# Patient Record
Sex: FEMALE | Race: WHITE | NOT HISPANIC OR LATINO | Employment: FULL TIME | URBAN - METROPOLITAN AREA
[De-identification: names, ages, dates, MRNs, and addresses within clinical notes are randomized per-mention and may not be internally consistent; named-entity substitution may affect disease eponyms.]

---

## 2022-01-08 ENCOUNTER — OFFICE VISIT (OUTPATIENT)
Dept: URGENT CARE | Facility: CLINIC | Age: 37
End: 2022-01-08
Payer: COMMERCIAL

## 2022-01-08 ENCOUNTER — APPOINTMENT (OUTPATIENT)
Dept: RADIOLOGY | Facility: CLINIC | Age: 37
End: 2022-01-08
Payer: COMMERCIAL

## 2022-01-08 VITALS
HEIGHT: 63 IN | BODY MASS INDEX: 33.31 KG/M2 | HEART RATE: 78 BPM | WEIGHT: 188 LBS | TEMPERATURE: 98.3 F | OXYGEN SATURATION: 99 % | RESPIRATION RATE: 20 BRPM

## 2022-01-08 DIAGNOSIS — J12.82 PNEUMONIA DUE TO COVID-19 VIRUS: ICD-10-CM

## 2022-01-08 DIAGNOSIS — R07.89 CHEST TIGHTNESS: ICD-10-CM

## 2022-01-08 DIAGNOSIS — R07.89 CHEST TIGHTNESS: Primary | ICD-10-CM

## 2022-01-08 DIAGNOSIS — U07.1 PNEUMONIA DUE TO COVID-19 VIRUS: ICD-10-CM

## 2022-01-08 PROCEDURE — 71046 X-RAY EXAM CHEST 2 VIEWS: CPT

## 2022-01-08 PROCEDURE — 99213 OFFICE O/P EST LOW 20 MIN: CPT | Performed by: PHYSICIAN ASSISTANT

## 2022-01-08 RX ORDER — LORAZEPAM 1 MG/1
1 TABLET ORAL
COMMUNITY

## 2022-01-08 RX ORDER — MOMETASONE FUROATE 220 UG/1
2 INHALANT RESPIRATORY (INHALATION) 2 TIMES DAILY
Qty: 1 EACH | Refills: 0 | Status: SHIPPED | OUTPATIENT
Start: 2022-01-08 | End: 2022-01-08 | Stop reason: CLARIF

## 2022-01-08 RX ORDER — ALBUTEROL SULFATE 2.5 MG/3ML
2.5 SOLUTION RESPIRATORY (INHALATION) EVERY 4 HOURS PRN
Qty: 240 ML | Refills: 0 | Status: SHIPPED | OUTPATIENT
Start: 2022-01-08

## 2022-01-08 RX ORDER — TOPIRAMATE 100 MG/1
100 TABLET, FILM COATED ORAL
COMMUNITY
Start: 2021-11-30

## 2022-01-08 RX ORDER — CYCLOBENZAPRINE HCL 10 MG
10 TABLET ORAL
COMMUNITY

## 2022-01-08 RX ORDER — GUAIFENESIN AND CODEINE PHOSPHATE 100; 10 MG/5ML; MG/5ML
SOLUTION ORAL
Qty: 120 ML | Refills: 0 | Status: SHIPPED | OUTPATIENT
Start: 2022-01-08

## 2022-01-08 RX ORDER — ALBUTEROL SULFATE 90 UG/1
2 AEROSOL, METERED RESPIRATORY (INHALATION)
COMMUNITY

## 2022-01-08 RX ORDER — FLUTICASONE PROPIONATE 220 UG/1
AEROSOL, METERED RESPIRATORY (INHALATION)
Qty: 12 G | Refills: 0 | Status: SHIPPED | OUTPATIENT
Start: 2022-01-08

## 2022-01-08 NOTE — LETTER
January 8, 2022     Patient: Alonso Mendez   YOB: 1985   Date of Visit: 1/8/2022       To Whom It May Concern:      Patient was seen in office today for ongoing medical ailment  Recommend continue off work until released by her primary care provider         Sincerely,        Gurvinder Elliott PA-C    CC: No Recipients

## 2022-01-08 NOTE — PROGRESS NOTES
[facility]    NAME: Isela Officer is a 43 y o  female  : 1985    MRN: [id number]  DATE: 2022  TIME: 12:55 PM    Assessment and Plan   Chest tightness [R07 89]  1  Chest tightness  XR chest pa & lateral   2  Pneumonia due to COVID-19 virus  albuterol (2 5 mg/3 mL) 0 083 % nebulizer solution    guaifenesin-codeine (GUAIFENESIN AC) 100-10 MG/5ML liquid    fluticasone (Flovent HFA) 220 mcg/act inhaler    DISCONTINUED: mometasone (Asmanex, 60 Metered Doses,) 220 MCG/INH inhaler     Chest x-ray:  Ground-glass appearance consistent with viral pneumonia in setting of positive COVID patient  Patient Instructions   Patient Instructions   If you do not already have a follow up appointment scheduled with your PCP for the next 1-2 days, please call them ASAP to arrange follow up examination  Use your albuterol inhaler as needed  and / or nebulizer  Start steroid inhaler (see instructions below and on prescription)  Add expectorant such as plain Mucinex  Take with full glass of water  Prescription cough medicine for bedtime only  We do not want to necessarily stop your cough as that is the body's way to protect your airways  Try to stay well hydrated  Try to not just sit around but move around your quarantine area  Take a couple deep breaths every hour while awake to help expand her lungs  Recommendations for COVID patients:    Vitamins:  Vitamin D3 2000 International Units daily  Pulse Oximeter Monitoring: For LOW risk patients:      Monitor your pulse / oxygen level at rest and while walking  If your oxygen level is below 90% at rest, go to ER for further evaluation  If your oxygen level is below 90% with activity (walking), go to ER for further evaluation       For HIGH risk patients:  (age > 72, BMI > 35, immunocompromised, chronic kidney disease, chronic lung disease, chronic heart disease, cancer of lung - blood or metastatic cancer )      Monitor your pulse / oxygen level at rest and while walking  If your oxygen level is below 90% at rest, go to ER for further evaluation  If your oxygen level is below 90% with activity (walking), go to ER for further evaluation  If significant worsening of your symptoms with profound weakness, shortness of breath, chest pain proceed to ER for immediate further evaluation  Avoid in store purchasing of supplies, foods, medications  Steroid inhaler:  Some people will benefit from using steroid inhaler  If prescribed please follow prescription instructions  Use as instructed  Always rinse mouth / throat out thoroughly after using  Chief Complaint     Chief Complaint   Patient presents with    COVID-19     Covid + 12/31/2022, Patient resports Chest Tightness        History of Present Illness   Tomi Ritter presents to the clinic c/o  78-year-old female comes in with tightness in chest, diarrhea, diaphoresis, nausea and congestion  Positive COVID test on 12/31/2021  Chest pain with cough and with breathing  Chest tightness  Tired  Smoker  No COVID vaccine  Has tried to get in with her family doctor but they will not see her  She tried to get albuterol inhaled solution for her nebulizer but they would not do that and told her she had to go to the walk-in clinic  She is taking her pulse ox and with exertion her oxygen level goes down to 93  Review of Systems   Review of Systems   Constitutional: Positive for activity change, appetite change, diaphoresis and fatigue  HENT: Positive for congestion  Respiratory: Positive for chest tightness and shortness of breath  Cardiovascular: Negative          Current Medications     Long-Term Medications   Medication Sig Dispense Refill    cyclobenzaprine (FLEXERIL) 10 mg tablet Take 10 mg by mouth      LORazepam (ATIVAN) 1 mg tablet Take 1 mg by mouth      topiramate (TOPAMAX) 100 mg tablet Take 100 mg by mouth      fluticasone (Flovent HFA) 220 mcg/act inhaler 2 inhalations twice a day  Rinse mouth after use  12 g 0       Current Allergies     Allergies as of 2022 - Reviewed 2022   Allergen Reaction Noted    Penicillins Hives 2013          The following portions of the patient's history were reviewed and updated as appropriate: allergies, current medications, past family history, past medical history, past social history, past surgical history and problem list   Past Medical History:   Diagnosis Date    Anxiety     Asthma     COPD (chronic obstructive pulmonary disease) ([facility] [address] )     Migraines      Past Surgical History:   Procedure Laterality Date     SECTION      WISDOM TOOTH EXTRACTION       History reviewed  No pertinent family history  Objective   Pulse 78   Temp 98 3 °F (36 8 °C)   Resp 20   Ht 5' 3" (1 6 m)   Wt 85 3 kg (188 lb)   LMP 12/15/2021   SpO2 99%   BMI 33 30 kg/m²   Patient's last menstrual period was 12/15/2021  Physical Exam     Physical Exam  Vitals and nursing note reviewed  Constitutional:       General: She is not in acute distress  Appearance: Normal appearance  She is ill-appearing  She is not toxic-appearing or diaphoretic  Comments: Appears mildly ill but in no acute distress  HENT:      Head: Normocephalic and atraumatic  Nose: Congestion and rhinorrhea present  Mouth/Throat:      Mouth: Mucous membranes are moist       Pharynx: No oropharyngeal exudate or posterior oropharyngeal erythema  Comments: Cobblestoning posterior pharynx  Eyes:      General: No scleral icterus  Right eye: No discharge  Left eye: No discharge  Extraocular Movements: Extraocular movements intact  Conjunctiva/sclera: Conjunctivae normal       Pupils: Pupils are equal, round, and reactive to light  Cardiovascular:      Rate and Rhythm: Normal rate and regular rhythm  Heart sounds: Normal heart sounds  No murmur heard    No friction rub  No gallop  Pulmonary:      Effort: Pulmonary effort is normal  No respiratory distress  Breath sounds: Normal breath sounds  No stridor  No wheezing, rhonchi or rales  Musculoskeletal:      Cervical back: Normal range of motion and neck supple  No rigidity or tenderness  No muscular tenderness  Lymphadenopathy:      Cervical: No cervical adenopathy  Skin:     General: Skin is warm and dry  Coloration: Skin is not pale  Findings: No rash  Comments: No acute rashes   Neurological:      Mental Status: She is alert and oriented to person, place, and time     Psychiatric:         Mood and Affect: Mood normal          Behavior: Behavior normal

## 2022-01-08 NOTE — PATIENT INSTRUCTIONS
If you do not already have a follow up appointment scheduled with your PCP for the next 1-2 days, please call them ASAP to arrange follow up examination  Use your albuterol inhaler as needed  and / or nebulizer  Start steroid inhaler (see instructions below and on prescription)  Add expectorant such as plain Mucinex  Take with full glass of water  Prescription cough medicine for bedtime only  We do not want to necessarily stop your cough as that is the body's way to protect your airways  Try to stay well hydrated  Try to not just sit around but move around your quarantine area  Take a couple deep breaths every hour while awake to help expand her lungs  Recommendations for COVID patients:    Vitamins:  Vitamin D3 2000 International Units daily  Pulse Oximeter Monitoring: For LOW risk patients:      Monitor your pulse / oxygen level at rest and while walking  If your oxygen level is below 90% at rest, go to ER for further evaluation  If your oxygen level is below 90% with activity (walking), go to ER for further evaluation  For HIGH risk patients:  (age > 72, BMI > 35, immunocompromised, chronic kidney disease, chronic lung disease, chronic heart disease, cancer of lung - blood or metastatic cancer )      Monitor your pulse / oxygen level at rest and while walking  If your oxygen level is below 90% at rest, go to ER for further evaluation  If your oxygen level is below 90% with activity (walking), go to ER for further evaluation  If significant worsening of your symptoms with profound weakness, shortness of breath, chest pain proceed to ER for immediate further evaluation  Avoid in store purchasing of supplies, foods, medications  Steroid inhaler:  Some people will benefit from using steroid inhaler  If prescribed please follow prescription instructions  Use as instructed  Always rinse mouth / throat out thoroughly after using

## 2023-10-21 ENCOUNTER — APPOINTMENT (OUTPATIENT)
Dept: RADIOLOGY | Facility: CLINIC | Age: 38
End: 2023-10-21
Payer: COMMERCIAL

## 2023-10-21 ENCOUNTER — OFFICE VISIT (OUTPATIENT)
Dept: URGENT CARE | Facility: CLINIC | Age: 38
End: 2023-10-21
Payer: COMMERCIAL

## 2023-10-21 VITALS
OXYGEN SATURATION: 98 % | TEMPERATURE: 97.6 F | DIASTOLIC BLOOD PRESSURE: 75 MMHG | HEIGHT: 63 IN | RESPIRATION RATE: 16 BRPM | HEART RATE: 78 BPM | BODY MASS INDEX: 32.78 KG/M2 | WEIGHT: 185 LBS | SYSTOLIC BLOOD PRESSURE: 162 MMHG

## 2023-10-21 DIAGNOSIS — I77.6 VASCULITIS (HCC): ICD-10-CM

## 2023-10-21 DIAGNOSIS — M79.641 RIGHT HAND PAIN: Primary | ICD-10-CM

## 2023-10-21 DIAGNOSIS — M79.641 RIGHT HAND PAIN: ICD-10-CM

## 2023-10-21 PROCEDURE — 73120 X-RAY EXAM OF HAND: CPT

## 2023-10-21 PROCEDURE — 99213 OFFICE O/P EST LOW 20 MIN: CPT

## 2023-10-21 RX ORDER — IBUPROFEN 600 MG/1
600 TABLET ORAL EVERY 8 HOURS PRN
Qty: 30 TABLET | Refills: 0 | Status: SHIPPED | OUTPATIENT
Start: 2023-10-21

## 2023-10-21 NOTE — PATIENT INSTRUCTIONS
Take ibuprofen as needed for pain. Apply a combination of heat and ice.   Follow-up with the orthopedic

## 2023-10-21 NOTE — PROGRESS NOTES
[facility]        NAME: Ely Hollis is a 42 y.o. female  : 1985    MRN: [id number]  DATE: 2023  TIME: 9:01 AM    Assessment and Plan   Right hand pain [M79.641]  1. Right hand pain  XR hand 2 vw right      2. Vasculitis (HCC)  ibuprofen (MOTRIN) 600 mg tablet        X-ray interpreted by myself. No acute osseous abnormality. Pending radiology review. Patient Instructions     Take ibuprofen as needed for pain. Apply a combination of heat and ice. Follow-up with the orthopedic  Follow up with PCP in 3-5 days. Proceed to  ER if symptoms worsen. Chief Complaint     Chief Complaint   Patient presents with    Hand Pain     Right hand pain started about 2 weeks ago. Starts at tip of middle finger and moves down into hand . History of Present Illness       Patient is a 37YOF presenting with right hand pain for the last two weeks. She states the pain is worse in her right middle finger and at times radiates into her hand. She has noticed increased swelling over the last two weeks. She denies any injury to the middle finger but has had a previous injury to her index finger. Hand Pain         Review of Systems   Review of Systems   Constitutional:  Negative for chills and fever. Musculoskeletal:  Positive for arthralgias and joint swelling. All other systems reviewed and are negative.         Current Medications       Current Outpatient Medications:     albuterol (2.5 mg/3 mL) 0.083 % nebulizer solution, Take 3 mL (2.5 mg total) by nebulization every 4 (four) hours as needed for wheezing, Disp: 240 mL, Rfl: 0    albuterol (PROVENTIL HFA,VENTOLIN HFA) 90 mcg/act inhaler, Inhale 2 puffs, Disp: , Rfl:     cyclobenzaprine (FLEXERIL) 10 mg tablet, Take 10 mg by mouth, Disp: , Rfl:     ibuprofen (MOTRIN) 600 mg tablet, Take 1 tablet (600 mg total) by mouth every 8 (eight) hours as needed for mild pain, Disp: 30 tablet, Rfl: 0    LORazepam (ATIVAN) 1 mg tablet, Take 1 mg by mouth, Disp: , Rfl:     topiramate (TOPAMAX) 100 mg tablet, Take 100 mg by mouth, Disp: , Rfl:     fluticasone (Flovent HFA) 220 mcg/act inhaler, 2 inhalations twice a day. Rinse mouth after use. (Patient not taking: Reported on 10/21/2023), Disp: 12 g, Rfl: 0    guaifenesin-codeine (GUAIFENESIN AC) 100-10 MG/5ML liquid, 5 mL q 6 hours or hs prn cough. Home use only. (Patient not taking: Reported on 10/21/2023), Disp: 120 mL, Rfl: 0    Current Allergies     Allergies as of 10/21/2023 - Reviewed 10/21/2023   Allergen Reaction Noted    Penicillins Hives 2013            The following portions of the patient's history were reviewed and updated as appropriate: allergies, current medications, past family history, past medical history, past social history, past surgical history and problem list.     Past Medical History:   Diagnosis Date    Anxiety     Asthma     COPD (chronic obstructive pulmonary disease) ([address] [facility])     Migraines        Past Surgical History:   Procedure Laterality Date     SECTION      WISDOM TOOTH EXTRACTION         Family History   Problem Relation Age of Onset    Heart disease Mother     Diabetes Mother     No Known Problems Father          Medications have been verified. Objective   /75   Pulse 78   Temp 97.6 °F (36.4 °C)   Resp 16   Ht 5' 3" (1.6 m)   Wt 83.9 kg (185 lb)   SpO2 98%   BMI 32.77 kg/m²        Physical Exam     Physical Exam  Vitals and nursing note reviewed. Constitutional:       General: She is not in acute distress. Appearance: Normal appearance. She is normal weight. She is not ill-appearing or toxic-appearing. Musculoskeletal:      Right hand: Swelling (noted to the PIP of the right middle finger) and tenderness present. No deformity or bony tenderness. Normal range of motion. Normal strength. Normal sensation. There is no disruption of two-point discrimination. Normal capillary refill. Normal pulse.       Left hand: Normal.   Neurological: Mental Status: She is alert.

## 2024-05-26 ENCOUNTER — OFFICE VISIT (OUTPATIENT)
Dept: URGENT CARE | Facility: CLINIC | Age: 39
End: 2024-05-26
Payer: COMMERCIAL

## 2024-05-26 VITALS
TEMPERATURE: 98.2 F | WEIGHT: 194 LBS | BODY MASS INDEX: 34.38 KG/M2 | HEART RATE: 74 BPM | OXYGEN SATURATION: 95 % | HEIGHT: 63 IN | DIASTOLIC BLOOD PRESSURE: 83 MMHG | SYSTOLIC BLOOD PRESSURE: 181 MMHG | RESPIRATION RATE: 16 BRPM

## 2024-05-26 DIAGNOSIS — L30.9 DERMATITIS: ICD-10-CM

## 2024-05-26 DIAGNOSIS — L29.9 ITCHY SKIN: Primary | ICD-10-CM

## 2024-05-26 PROCEDURE — 99213 OFFICE O/P EST LOW 20 MIN: CPT | Performed by: PHYSICIAN ASSISTANT

## 2024-05-26 RX ORDER — PERMETHRIN 50 MG/G
CREAM TOPICAL ONCE
Qty: 60 G | Refills: 1 | Status: SHIPPED | OUTPATIENT
Start: 2024-05-26 | End: 2024-05-26

## 2024-05-26 RX ORDER — PREDNISONE 10 MG/1
10 TABLET ORAL DAILY
Qty: 21 TABLET | Refills: 0 | Status: SHIPPED | OUTPATIENT
Start: 2024-05-26

## 2024-05-26 NOTE — PROGRESS NOTES
[facility]        NAME: Lauren Best is a 38 y.o. female  : 1985    MRN: [id number]  DATE: May 26, 2024  TIME: 1:49 PM    Assessment and Plan   Itchy skin [L29.9]  1. Itchy skin  permethrin (ELIMITE) 5 % cream    predniSONE 10 mg tablet    Ambulatory Referral to Dermatology      2. Dermatitis  permethrin (ELIMITE) 5 % cream    predniSONE 10 mg tablet    Ambulatory Referral to Dermatology            Patient Instructions    May use over-the-counter Benadryl in addition to prednisone if needed.  Avoid scratching.  Cool showers.  Ice packs.  Observe for signs of infection including redness, cloudy drainage, swelling, streaking up the extremity, fevers and chills, or persistent symptoms.  Follow-up with PCP in 3-5 days.  Go to ER if symptoms become severe.      Follow up with PCP in 3-5 days.  Proceed to  ER if symptoms worsen.    If tests have been performed at [facility], our office will contact you with results if changes need to be made to the care plan discussed with you at the visit.  You can review your full results on [facility].    Chief Complaint     Chief Complaint   Patient presents with    Skin Problem     Itchy skin with rash that comes and goes starting beginning of May. Thought it was midge flies, had  come out to check house but was not able to find anything.          History of Present Illness       Patient is a 38-year-old female with significant past medical history of COPD presents the office complaining of full body itchy rash for 3 weeks.  States the rash comes and goes but has been getting worse.  She thought it was bug bites but she had a  come and she continues to have symptoms.  Her  and children also have symptoms.  States her dog was scratching but was taken to the vet and was told it was allergies.  Denies fevers, chills, or recent illness.  Denies travel.  Denies recent hotel.  Denies change in detergent, body products, pets, foods,  or medications.        Review of Systems   Review of Systems   Constitutional:  Negative for fever.   Skin:  Positive for rash.         Current Medications       Current Outpatient Medications:     albuterol (2.5 mg/3 mL) 0.083 % nebulizer solution, Take 3 mL (2.5 mg total) by nebulization every 4 (four) hours as needed for wheezing, Disp: 240 mL, Rfl: 0    albuterol (PROVENTIL HFA,VENTOLIN HFA) 90 mcg/act inhaler, Inhale 2 puffs, Disp: , Rfl:     cyclobenzaprine (FLEXERIL) 10 mg tablet, Take 10 mg by mouth, Disp: , Rfl:     LORazepam (ATIVAN) 1 mg tablet, Take 1 mg by mouth, Disp: , Rfl:     permethrin (ELIMITE) 5 % cream, Apply topically once for 1 dose, Disp: 60 g, Rfl: 1    predniSONE 10 mg tablet, Take 1 tablet (10 mg total) by mouth daily Take 6 on day 1, take 5 on day 2, take 4 on day 3, take 3 on day 4, take 2 on day 5, take 1 on day 6., Disp: 21 tablet, Rfl: 0    topiramate (TOPAMAX) 100 mg tablet, Take 100 mg by mouth, Disp: , Rfl:     Ubrogepant (Ubrelvy) 100 MG tablet, Take 100 mg by mouth, Disp: , Rfl:     fluticasone (Flovent HFA) 220 mcg/act inhaler, 2 inhalations twice a day.  Rinse mouth after use. (Patient not taking: Reported on 10/21/2023), Disp: 12 g, Rfl: 0    guaifenesin-codeine (GUAIFENESIN AC) 100-10 MG/5ML liquid, 5 mL q 6 hours or hs prn cough.  Home use only. (Patient not taking: Reported on 10/21/2023), Disp: 120 mL, Rfl: 0    ibuprofen (MOTRIN) 600 mg tablet, Take 1 tablet (600 mg total) by mouth every 8 (eight) hours as needed for mild pain (Patient not taking: Reported on 5/26/2024), Disp: 30 tablet, Rfl: 0    Current Allergies     Allergies as of 05/26/2024 - Reviewed 05/26/2024   Allergen Reaction Noted    Penicillins Hives 02/13/2013            The following portions of the patient's history were reviewed and updated as appropriate: allergies, current medications, past family history, past medical history, past social history, past surgical history and problem list.     Past  "Medical History:   Diagnosis Date    Anxiety     Asthma     COPD (chronic obstructive pulmonary disease) (HCC)     Migraines        Past Surgical History:   Procedure Laterality Date     SECTION      WISDOM TOOTH EXTRACTION         Family History   Problem Relation Age of Onset    Heart disease Mother     Diabetes Mother     Cancer Father          Medications have been verified.        Objective   BP (!) 181/83   Pulse 74   Temp 98.2 °F (36.8 °C)   Resp 16   Ht 5' 3\" (1.6 m)   Wt 88 kg (194 lb)   LMP 2024   SpO2 95%   BMI 34.37 kg/m²   Patient's last menstrual period was 2024.       Physical Exam     Physical Exam  Vitals and nursing note reviewed.   Constitutional:       Appearance: She is well-developed.   HENT:      Head: Normocephalic and atraumatic.      Right Ear: External ear normal.      Left Ear: External ear normal.      Nose: Nose normal.   Eyes:      General: Lids are normal.      Conjunctiva/sclera: Conjunctivae normal.   Skin:     General: Skin is warm and dry.      Capillary Refill: Capillary refill takes less than 2 seconds.      Findings: Rash (Diffuse scattered erythematous papular rash.  See image.) present.   Neurological:      Mental Status: She is alert.                                 "

## 2024-06-18 ENCOUNTER — OFFICE VISIT (OUTPATIENT)
Dept: URGENT CARE | Facility: CLINIC | Age: 39
End: 2024-06-18
Payer: COMMERCIAL

## 2024-06-18 ENCOUNTER — APPOINTMENT (OUTPATIENT)
Dept: RADIOLOGY | Facility: CLINIC | Age: 39
End: 2024-06-18
Payer: COMMERCIAL

## 2024-06-18 VITALS
WEIGHT: 196 LBS | SYSTOLIC BLOOD PRESSURE: 167 MMHG | HEART RATE: 83 BPM | RESPIRATION RATE: 18 BRPM | TEMPERATURE: 97.7 F | HEIGHT: 64 IN | DIASTOLIC BLOOD PRESSURE: 86 MMHG | OXYGEN SATURATION: 97 % | BODY MASS INDEX: 33.46 KG/M2

## 2024-06-18 DIAGNOSIS — S92.214A CLOSED NONDISPLACED FRACTURE OF CUBOID OF RIGHT FOOT, INITIAL ENCOUNTER: Primary | ICD-10-CM

## 2024-06-18 DIAGNOSIS — M79.671 RIGHT FOOT PAIN: ICD-10-CM

## 2024-06-18 PROCEDURE — 73630 X-RAY EXAM OF FOOT: CPT

## 2024-06-18 PROCEDURE — 99213 OFFICE O/P EST LOW 20 MIN: CPT

## 2024-06-18 NOTE — LETTER
June 18, 2024     Patient: Lauren Best   YOB: 1985   Date of Visit: 6/18/2024       To Whom It May Concern:    It is my medical opinion that Lauren Best may return to light duty immediately with the following restrictions: seated position only until follow up with specialist .     If you have any questions or concerns, please don't hesitate to call.         Sincerely,        Gayathri Barlow PA-C    CC: No Recipients

## 2024-06-18 NOTE — PATIENT INSTRUCTIONS
Your finalized x-ray results will be available on [facility] when read by the radiologist.  Recommend rest, ice, elevation. Over the counter pain medication as directed on packaging as needed for pain (ex: Tylenol, ibuprofen).  Provided with boot and crutches.  Referral to podiatry placed for follow up.    Follow up with PCP in 3-5 days.  Proceed to  ER if symptoms worsen.    If tests are performed, our office will contact you with results only if changes need to made to the care plan discussed with you at the visit. You can review your full results on [facility].    Foot Fracture in Adults   AMBULATORY CARE:   A foot fracture  is a break in a bone in your foot.       Common signs and symptoms:   Tenderness over the injured area    Foot pain that increases when you try to stand or walk    Numbness in your foot or toes    Cracking sounds when you move your foot    Swelling, bruising, blistering, or open skin breaks    Trouble moving your foot or walking    Foot shape that is not normal    Call your local emergency number (911 in the [place]) if:   You suddenly feel lightheaded and short of breath.    You have chest pain when you take a deep breath or cough.    You cough up blood.    Seek care immediately if:   The pain in your injured foot gets worse even after you rest and take pain medicine.    The skin or toes of your foot become numb, swollen, cold, white, or blue.    You have more pain or swelling than you did before a cast was put on.    Your leg feels warm, tender, and painful. It may look swollen and red.    Call your doctor if:   You have a fever.    You have new sores around your boot, cast, or splint.    You have new or worsening trouble moving your foot.    You notice a foul smell coming from under your cast.    Your boot, cast, or splint gets damaged.    You have questions or concerns about your condition or care.    Treatment  depends on the kind of fracture you have and how bad it is. You may need any  of the following:  A boot, cast, or splint  may be put on your foot and lower leg to decrease your foot movement. These work to hold the broken bones in place, decrease pain, and prevent more damage to your foot.    Medicines  may be given to prevent or treat pain or a bacterial infection. You may also need a vaccine to prevent tetanus if bone broke through the skin. A tetanus shot is given if you have not had a booster in the past 5 to 10 years.    Surgery  may be used to put your bones back into the correct position. Wires, pins, plates, or screws may be used to keep the broken pieces lined up correctly and hold them together.    Self-care:   Rest  your foot and avoid activities that cause pain.    Apply ice  to decrease swelling and pain, and to prevent tissue damage. Use an ice pack, or put crushed ice in a plastic bag. Cover it with a towel before you apply it. Use ice for 15 to 20 minutes every hour or as directed.    Elevate your foot  above the level of your heart as often as you can. This will help decrease swelling and pain. Prop your foot on pillows or blankets to keep it elevated comfortably.         Physical therapy  may be needed when your foot has healed. A physical therapist can teach you exercises to help improve movement and strength, and to decrease pain.    Cast or splint care:   Ask when it is okay to take a bath or shower. Do not let your cast or splint get wet. Before you bathe, cover the cast or splint with a plastic bag. Tape the bag to your skin above the splint to seal out water. Keep your foot out of the water in case the bag leaks.    Check the skin around your splint daily for any redness or open areas.    Do not use a sharp or pointed object to scratch your skin under the splint.    Do not remove your splint unless your healthcare provider or orthopedic surgeon says it is okay.    Assistive devices:  You may be given a hard-soled shoe to wear while your foot is healing. You also may  need to use crutches to help you walk while your foot heals. It is important to use your crutches correctly. Ask for more information about how to use crutches.  Follow up with your doctor or bone specialist as directed:  You may need to return to have your splint or stitches removed. You may also need to return for tests to make sure your foot is healing. Write down your questions so you remember to ask them during your visits.  © Copyright [employer] 2023 Information is for End User's use only and may not be sold, redistributed or otherwise used for commercial purposes.  The above information is an  only. It is not intended as medical advice for individual conditions or treatments. Talk to your doctor, nurse or pharmacist before following any medical regimen to see if it is safe and effective for you.

## 2024-06-18 NOTE — PROGRESS NOTES
[facility]        NAME: Lauren Best is a 38 y.o. female  : 1985    MRN: [id number]  DATE: 2024  TIME: 5:45 PM    Assessment and Plan   Closed nondisplaced fracture of cuboid of right foot, initial encounter [S92.214A]  1. Closed nondisplaced fracture of cuboid of right foot, initial encounter  Ambulatory Referral to Podiatry      2. Right foot pain  XR foot 3+ vw right        Right foot x-ray: Concerns for potential fracture at cuboid per provider interpretation. Radiology to determine final read.    Provided with boot and crutches. Referral to podiatry provided.    Patient Instructions     Your finalized x-ray results will be available on [employer] when read by the radiologist.  Recommend rest, ice, elevation. Over the counter pain medication as directed on packaging as needed for pain (ex: Tylenol, ibuprofen).  Provided with boot and crutches.  Referral to podiatry placed for follow up.    Follow up with PCP in 3-5 days.  Proceed to  ER if symptoms worsen.    If tests are performed, our office will contact you with results only if changes need to made to the care plan discussed with you at the visit. You can review your full results on [facility] [employer] [employer].    Chief Complaint     Chief Complaint   Patient presents with    Foot Pain     Right foot pain starting today after hitting foot off of bed/box while walking.          History of Present Illness       Patient presents with pain to the right foot. She states around lunch time today she was walking and accidentally kicked her foot into the bottom of her bed and a box. She notes pain in the outer side of the foot, the top of the foot, and pain radiating up to her leg. The pain is worse when weight-bearing, she notes limping when she walks. She feels numbness and tingling to the outer aspect of the foot. She notes swelling to the area. She has tried ice and elevation without any significant relief.        Review of Systems   Review of  Systems   Musculoskeletal:  Positive for arthralgias, gait problem and joint swelling.   Skin:  Negative for color change.   Neurological:  Positive for numbness.         Current Medications       Current Outpatient Medications:     albuterol (2.5 mg/3 mL) 0.083 % nebulizer solution, Take 3 mL (2.5 mg total) by nebulization every 4 (four) hours as needed for wheezing, Disp: 240 mL, Rfl: 0    albuterol (PROVENTIL HFA,VENTOLIN HFA) 90 mcg/act inhaler, Inhale 2 puffs, Disp: , Rfl:     cyclobenzaprine (FLEXERIL) 10 mg tablet, Take 10 mg by mouth, Disp: , Rfl:     LORazepam (ATIVAN) 1 mg tablet, Take 1 mg by mouth, Disp: , Rfl:     topiramate (TOPAMAX) 100 mg tablet, Take 100 mg by mouth, Disp: , Rfl:     Ubrogepant (Ubrelvy) 100 MG tablet, Take 100 mg by mouth, Disp: , Rfl:     fluticasone (Flovent HFA) 220 mcg/act inhaler, 2 inhalations twice a day.  Rinse mouth after use. (Patient not taking: Reported on 10/21/2023), Disp: 12 g, Rfl: 0    guaifenesin-codeine (GUAIFENESIN AC) 100-10 MG/5ML liquid, 5 mL q 6 hours or hs prn cough.  Home use only. (Patient not taking: Reported on 10/21/2023), Disp: 120 mL, Rfl: 0    ibuprofen (MOTRIN) 600 mg tablet, Take 1 tablet (600 mg total) by mouth every 8 (eight) hours as needed for mild pain (Patient not taking: Reported on 5/26/2024), Disp: 30 tablet, Rfl: 0    predniSONE 10 mg tablet, Take 1 tablet (10 mg total) by mouth daily Take 6 on day 1, take 5 on day 2, take 4 on day 3, take 3 on day 4, take 2 on day 5, take 1 on day 6. (Patient not taking: Reported on 6/18/2024), Disp: 21 tablet, Rfl: 0    Current Allergies     Allergies as of 06/18/2024 - Reviewed 06/18/2024   Allergen Reaction Noted    Penicillins Hives 02/13/2013            The following portions of the patient's history were reviewed and updated as appropriate: allergies, current medications, past family history, past medical history, past social history, past surgical history and problem list.     Past Medical  "History:   Diagnosis Date    Anxiety     Asthma     COPD (chronic obstructive pulmonary disease) (HCC)     Migraines        Past Surgical History:   Procedure Laterality Date     SECTION      HAND TENDON SURGERY      WISDOM TOOTH EXTRACTION         Family History   Problem Relation Age of Onset    Heart disease Mother     Diabetes Mother     Cancer Father          Medications have been verified.        Objective   /86   Pulse 83   Temp 97.7 °F (36.5 °C)   Resp 18   Ht 5' 3.5\" (1.613 m)   Wt 88.9 kg (196 lb)   LMP 2024   SpO2 97%   BMI 34.18 kg/m²        Physical Exam     Physical Exam  Vitals and nursing note reviewed.   Constitutional:       General: She is not in acute distress.     Appearance: Normal appearance. She is not ill-appearing.   Cardiovascular:      Rate and Rhythm: Normal rate and regular rhythm.      Pulses: Normal pulses.      Heart sounds: Normal heart sounds.   Pulmonary:      Effort: Pulmonary effort is normal.      Breath sounds: Normal breath sounds.   Musculoskeletal:      Right ankle: Swelling present. No deformity or ecchymosis. Tenderness (minimal, lateral malleolus) present. Decreased range of motion. Normal pulse.      Right foot: Decreased range of motion. Normal capillary refill. Swelling, tenderness and bony tenderness present. No deformity. Normal pulse.        Feet:    Neurological:      Mental Status: She is alert.                   "

## 2024-06-19 ENCOUNTER — OFFICE VISIT (OUTPATIENT)
Dept: OBGYN CLINIC | Facility: CLINIC | Age: 39
End: 2024-06-19

## 2024-06-19 VITALS
OXYGEN SATURATION: 97 % | HEIGHT: 64 IN | SYSTOLIC BLOOD PRESSURE: 134 MMHG | TEMPERATURE: 97.6 F | DIASTOLIC BLOOD PRESSURE: 82 MMHG | WEIGHT: 196 LBS | BODY MASS INDEX: 33.46 KG/M2 | HEART RATE: 82 BPM

## 2024-06-19 DIAGNOSIS — M79.671 RIGHT FOOT PAIN: ICD-10-CM

## 2024-06-19 DIAGNOSIS — S90.31XA CONTUSION OF RIGHT FOOT, INITIAL ENCOUNTER: ICD-10-CM

## 2024-06-19 DIAGNOSIS — S99.921A INJURY OF RIGHT FOOT, INITIAL ENCOUNTER: Primary | ICD-10-CM

## 2024-06-19 PROCEDURE — 99204 OFFICE O/P NEW MOD 45 MIN: CPT | Performed by: STUDENT IN AN ORGANIZED HEALTH CARE EDUCATION/TRAINING PROGRAM

## 2024-06-19 RX ORDER — CLOBETASOL PROPIONATE 0.5 MG/G
CREAM TOPICAL
COMMUNITY
Start: 2024-06-14

## 2024-06-19 RX ORDER — BUDESONIDE AND FORMOTEROL FUMARATE DIHYDRATE 160; 4.5 UG/1; UG/1
2 AEROSOL RESPIRATORY (INHALATION)
COMMUNITY

## 2024-06-19 RX ORDER — LEVETIRACETAM 250 MG/1
TABLET ORAL
COMMUNITY

## 2024-06-19 RX ORDER — MONTELUKAST SODIUM 10 MG/1
TABLET ORAL
COMMUNITY

## 2024-06-19 RX ORDER — CHOLECALCIFEROL (VITAMIN D3) 100000/G
POWDER (GRAM) MISCELLANEOUS
COMMUNITY

## 2024-06-19 RX ORDER — VENLAFAXINE 37.5 MG/1
TABLET ORAL
COMMUNITY

## 2024-06-19 RX ORDER — HYDROCHLOROTHIAZIDE 25 MG/1
TABLET ORAL
COMMUNITY

## 2024-06-19 RX ORDER — NAPROXEN 500 MG/1
500 TABLET ORAL 2 TIMES DAILY WITH MEALS
Qty: 40 TABLET | Refills: 0 | Status: SHIPPED | OUTPATIENT
Start: 2024-06-19

## 2024-06-19 RX ORDER — PERMETHRIN 50 MG/G
CREAM TOPICAL
COMMUNITY
Start: 2024-05-26

## 2024-06-19 RX ORDER — RIZATRIPTAN BENZOATE 10 MG/1
TABLET ORAL
COMMUNITY

## 2024-06-19 NOTE — LETTER
June 19, 2024     Patient: Lauren Best  YOB: 1985  Date of Visit: 6/19/2024      To Whom it May Concern:    Lauren Best is under my professional care. Lauren was seen in my office on 6/19/2024. Restricted to weightbearing in CAM boot for right foot at this time. Restricted from climbing ladders/stairs. Planned follow up in 2 weeks for re-evaluation.    If you have any questions or concerns, please don't hesitate to call.         Sincerely,          Melecio Vann MD        CC: No Recipients

## 2024-06-19 NOTE — PROGRESS NOTES
1. Injury of right foot, initial encounter  Ambulatory Referral to Podiatry    naproxen (NAPROSYN) 500 mg tablet      2. Right foot pain  naproxen (NAPROSYN) 500 mg tablet      3. Contusion of right foot, initial encounter  naproxen (NAPROSYN) 500 mg tablet        No orders of the defined types were placed in this encounter.       Imaging Studies (I personally reviewed images in PACS and report):    X-ray right foot 6/18/2024: No acute osseous abnormalities.  Small calcaneal spur.  Unremarkable soft tissues.    IMPRESSION:  Acute right 4th/5th toes, lateral foot injury after reportedly inadvertently kicking package with large picture frame -  severe pain of 4th.5th toes and general lateral foot, radiating pain to lateral ankle and lateral aspect of lower leg, inability to weightbear; diffusely tender/hyperalgesic to even light palpation on exam today. Clinically minimal swelling, no ecchymosis but unable to tolerate weightbearing without CAM boot  Radiograph unremarkable  Ddx: toe sprain, occult fracture over lateral foot/toes not well appreciated on imaging, myofascial contusion injury, peroneal neuropraxia (no evidence of foot drop on exam today)  Date of Injury: 6/18/2024  Follow up interval: 1 day    Other factors:  Tobacco use d/o  COPD/asthma  H/o anxiety/depression    PLAN:    Clinical exam and radiographic imaging reviewed with patient today, with impression as per above. I have discussed with the patient the pathophysiology of this diagnosis and reviewed how the examination correlates with this diagnosis.    Imaging obtained/reviewed as per above.   Counseled conservative treatment at this time and WBAT in high tide CAM boot as tolerated. Can use crutches if needed to facilitate mobility.   Work note with accommodations provided.  In regards to pain control, I prescribed naproxen 500 mg p.o. twice daily and I counseled her to consistently take this for 1 week then only as needed afterwards.  Counseled she  "can concurrently use acetaminophen with this medication as well.  I also counseled use of heat/ice therapy 20 minutes on/off, elevation of the affected extremity.  No acute close follow-up with her in 2 weeks to reevaluate her progress and may need to consider repeat imaging to rule out a potential occult fracture given the hyperalgesic presentation on exam today.    Return in about 2 weeks (around 7/3/2024).    Portions of the record may have been created with voice recognition software. Occasional wrong word or \"sound a like\" substitutions may have occurred due to the inherent limitations of voice recognition software. Read the chart carefully and recognize, using context, where substitutions have occurred.     CHIEF COMPLAINT:  Chief Complaint   Patient presents with    Right Foot - Pain         HPI:  Lauren Best is a 38 y.o. female  who presents for       Visit 6/19/2024 :  Initial evaluation of right foot injury  Precipitating injury yesterday  Was walking and inadvertently kicked a large box containing a large picture frame  Reports her 4th/5th toe being bent \"outwards\" and also striking the lateral aspect of her foot. Denies twisting of ankle. Notes immediate severe sharp/shooting pain that radiated up the lateral aspect of her right lower leg. +swelling, denies bruising/discoloration. Reports tingling/numbness sensation over lateral aspect of foot. She was only able to partially tolerate weightbearing with a limp. Tried to manage conservatively with icing and elevation. Did not take any nsaids/tylenol.  Went to ER same day and had imaging done as above. There was concern for fracture and patient placed in high tide CAM boot and crutches with recommendation of NWB with crutches/CAM boot.  note reviewed She is here to follow up from .  Reports today that she continues to experience constant pain of her lateral foot and 4th/5th toes. Pain radiates to lateral ankle/lower leg. Described as sharp/shooting. " "Pain is of moderate/severe intensity. Reports difficulty NWB with crutches and CAM boot and has not tried CAM boot alone to weightbear.     Medical, Surgical, Family, and Social History    Past Medical History:   Diagnosis Date    Anxiety     Asthma     COPD (chronic obstructive pulmonary disease) ([facility])     Migraines      Past Surgical History:   Procedure Laterality Date     SECTION      HAND TENDON SURGERY      WISDOM TOOTH EXTRACTION       Social History   Social History     Substance and Sexual Activity   Alcohol Use Not Currently     Social History     Substance and Sexual Activity   Drug Use Never     Social History     Tobacco Use   Smoking Status Every Day    Current packs/day: 1.00    Types: Cigarettes   Smokeless Tobacco Never     Family History   Problem Relation Age of Onset    Heart disease Mother     Diabetes Mother     Cancer Father      Allergies   Allergen Reactions    Penicillin G Hives    Penicillins Hives     Other reaction(s): PENICILLIN G POTASSIUM (PENICILLIN)            Physical Exam  /82   Pulse 82   Temp 97.6 °F (36.4 °C) (Temporal)   Ht 5' 3.5\" (1.613 m)   Wt 88.9 kg (196 lb)   LMP 2024   SpO2 97%   BMI 34.18 kg/m²     Constitutional:  see vital signs  Gen: well-developed, normocephalic/atraumatic, well-groomed  Eyes: No inflammation or discharge of conjunctiva or lids; sclera clear   Pharynx: no inflammation, lesion, or mass of lips  Neck: supple, no masses, non-distended  MSK: no inflammation, lesion, mass, or clubbing of nails and digits except for other than mentioned below  SKIN: no visible rashes or skin lesions  Pulmonary/Chest: Effort normal. No respiratory distress.   NEURO: cranial nerves grossly intact  PSYCH:  Alert and oriented to person, place, and time; recent and remote memory intact; mood normal, no depression, anxiety, or agitation, judgment and insight good and intact     Ortho Exam   Foot/Ankle Examination (focused):     Gait: Patient comes " into the office today nonweightbearing on her right lower extremity with use of crutches and a high tide Cam boot.  Patient is able to demonstrate weightbearing in the cam boot alone today upon exiting the office.      RIGHT   Inspection Erythema none    Edema Scant edema of lateral foot and 4th/5th toes    Ecchymosis none        ROM:   Patient deferred any ROM from a neutral position (90 degrees) of ankle due to pain. Toes in fully extended              Strength Pronation Deferred due to pain    Supination Deferred due to pain    Foot plantarflexion Deferred due to pain    Foot dorsflexion Deferred due to pain        TTP AiTFL no    ATFL +mild    CFL no    PTFL no    Achilles no    Deltoid no    Peroneal +    Tib Ant no    Tib Post no        TTP (Bony)  Non-tender to proximal fibula. Minimal pain over lateral malleolus. Diffusely TTP/hyperalgesic to even light palpation over lateral foot up to 4th and 5th toes. Non-tender over medial malleolus         Anterior Drawer ATFL Unable to perform d/t pain   Calcaneal Squeeze  negative   Tib-Fib Squeeze Test  negative   Talar Tilt (stab tib,DF foot,invert foot) CFL Unable to perform d/t pain   MT Compression  Positive (general lateral aspect of foot)         No calf tenderness to palpation     LE NV Exam: +2 DP/PT pulses   Sensation intact to light touch          Procedures

## 2024-07-10 ENCOUNTER — HOSPITAL ENCOUNTER (OUTPATIENT)
Dept: RADIOLOGY | Facility: CLINIC | Age: 39
Discharge: HOME | End: 2024-07-10
Payer: COMMERCIAL

## 2024-07-10 ENCOUNTER — OFFICE VISIT (OUTPATIENT)
Dept: OBGYN CLINIC | Facility: CLINIC | Age: 39
End: 2024-07-10
Payer: COMMERCIAL

## 2024-07-10 VITALS
TEMPERATURE: 98.6 F | SYSTOLIC BLOOD PRESSURE: 125 MMHG | BODY MASS INDEX: 34.73 KG/M2 | HEIGHT: 63 IN | HEART RATE: 75 BPM | WEIGHT: 196 LBS | DIASTOLIC BLOOD PRESSURE: 85 MMHG | OXYGEN SATURATION: 98 %

## 2024-07-10 DIAGNOSIS — M79.671 RIGHT FOOT PAIN: Primary | ICD-10-CM

## 2024-07-10 DIAGNOSIS — S99.921D INJURY OF RIGHT FOOT, SUBSEQUENT ENCOUNTER: ICD-10-CM

## 2024-07-10 DIAGNOSIS — S90.31XA CONTUSION OF RIGHT FOOT, INITIAL ENCOUNTER: ICD-10-CM

## 2024-07-10 DIAGNOSIS — M79.671 RIGHT FOOT PAIN: ICD-10-CM

## 2024-07-10 PROCEDURE — 99213 OFFICE O/P EST LOW 20 MIN: CPT | Performed by: STUDENT IN AN ORGANIZED HEALTH CARE EDUCATION/TRAINING PROGRAM

## 2024-07-10 PROCEDURE — 73630 X-RAY EXAM OF FOOT: CPT

## 2024-07-10 NOTE — LETTER
July 10, 2024     Patient: Lauren Best  YOB: 1985  Date of Visit: 7/10/2024      To Whom it May Concern:    Lauren Best is under my professional care. Lauren was seen in my office on 7/10/2024. Patient can return to work in regular footwear for her right foot on 7/15/2024 without restrictions.    If you have any questions or concerns, please don't hesitate to call.         Sincerely,          Melecio Vann MD        CC: No Recipients

## 2024-07-15 NOTE — PROGRESS NOTES
1. Right foot pain  XR foot 3+ vw right      2. Injury of right foot, subsequent encounter  XR foot 3+ vw right      3. Contusion of right foot, initial encounter          Orders Placed This Encounter   Procedures    XR foot 3+ vw right        Imaging Studies (I personally reviewed images in PACS and report):    X-ray right foot 7/10/2024: No acute osseous abnormalities.  No interval changes compared to prior imaging.  Small plantar calcaneal spur noted.  Soft tissues unremarkable.  X-ray right foot 6/18/2024: No acute osseous abnormalities.  Small calcaneal spur.  Unremarkable soft tissues.    IMPRESSION:  Acute right 4th/5th toes, lateral foot injury after reportedly inadvertently kicking package with large picture frame -  severe pain of 4th.5th toes and general lateral foot, radiating pain to lateral ankle and lateral aspect of lower leg, inability to weightbear; diffusely tender/hyperalgesic to even light palpation on exam today. Clinically minimal swelling, no ecchymosis but unable to tolerate weightbearing without CAM boot  Radiograph unremarkable initially; repeat radiographs today are unremarkable as well  Symptoms progressively improving since last visit  We will treat as a toe sprain/myofascial strain/contusion injury of the toes and feet  Date of Injury: 6/18/2024  Follow-up interval: 3 weeks, 1 day    Other factors:  Tobacco use d/o  COPD/asthma  H/o anxiety/depression    PLAN:    Clinical exam and radiographic imaging reviewed with patient today, with impression as per above. I have discussed with the patient the pathophysiology of this diagnosis and reviewed how the examination correlates with this diagnosis.    Repeated imaging of her right foot today as noted above.  A will follow-up official radiology interpretation.  I reassured patient that I did not note any acute osseous abnormalities to suggest an occult fracture.  Given her progressive improvement, I counseled that she can return back to  "weightbearing as tolerated in regular footwear.  I counseled use of inserts to help facilitate the transition from cam boot.  In regards to pain counseled as needed use of acetaminophen, NSAIDs, heat/ice therapy 20 minutes on/off.  Offered formal PT as well as a supplemental treatment modality but patient deferred this option for now.    No follow-ups on file.    Portions of the record may have been created with voice recognition software. Occasional wrong word or \"sound a like\" substitutions may have occurred due to the inherent limitations of voice recognition software. Read the chart carefully and recognize, using context, where substitutions have occurred.     CHIEF COMPLAINT:  Chief Complaint   Patient presents with    Right Foot - Follow-up    Follow-up         HPI:  Lauren Best is a 38 y.o. female  who presents for     Visit 7/10/2024:  Follow-up evaluation of right fourth/fifth toe and lateral foot injury:  Patient reports some improvement since last visit.  Has adhere to weightbearing as tolerated in the cam boot.  States she still has pain of her fourth and fifth toes as well as her lateral foot but the intensity and frequency have been reducing.  She describes it as sharp pain.  Denies any recurrent foot swelling, discoloration.  Reports some tingling sensations over her toes and lateral foot as well.  Reports intact ankle range of motion and function.  Denies any new injury since last visit.    Prior visit 6/19/2024 :  Initial evaluation of right foot injury  Precipitating injury yesterday  Was walking and inadvertently kicked a large box containing a large picture frame  Reports her 4th/5th toe being bent \"outwards\" and also striking the lateral aspect of her foot. Denies twisting of ankle. Notes immediate severe sharp/shooting pain that radiated up the lateral aspect of her right lower leg. +swelling, denies bruising/discoloration. Reports tingling/numbness sensation over lateral aspect of foot. She " "was only able to partially tolerate weightbearing with a limp. Tried to manage conservatively with icing and elevation. Did not take any nsaids/tylenol.  Went to ER same day and had imaging done as above. There was concern for fracture and patient placed in high tide CAM boot and crutches with recommendation of NWB with crutches/CAM boot.  note reviewed She is here to follow up from .  Reports today that she continues to experience constant pain of her lateral foot and 4th/5th toes. Pain radiates to lateral ankle/lower leg. Described as sharp/shooting. Pain is of moderate/severe intensity. Reports difficulty NWB with crutches and CAM boot and has not tried CAM boot alone to weightbear.     Medical, Surgical, Family, and Social History    Past Medical History:   Diagnosis Date    Anxiety     Asthma     COPD (chronic obstructive pulmonary disease) (HCC)     Migraines      Past Surgical History:   Procedure Laterality Date     SECTION      HAND TENDON SURGERY      WISDOM TOOTH EXTRACTION       Social History   Social History     Substance and Sexual Activity   Alcohol Use Not Currently     Social History     Substance and Sexual Activity   Drug Use Never     Social History     Tobacco Use   Smoking Status Every Day    Current packs/day: 1.00    Types: Cigarettes   Smokeless Tobacco Never     Family History   Problem Relation Age of Onset    Heart disease Mother     Diabetes Mother     Cancer Father      Allergies   Allergen Reactions    Penicillin G Hives    Penicillins Hives     Other reaction(s): PENICILLIN G POTASSIUM (PENICILLIN)            Physical Exam  /85 (BP Location: Left arm)   Pulse 75   Temp 98.6 °F (37 °C)   Ht 5' 3\" (1.6 m)   Wt 88.9 kg (196 lb)   LMP 2024   SpO2 98%   BMI 34.72 kg/m²     Constitutional:  see vital signs  Gen: BMI 34.72, normocephalic/atraumatic, well-groomed  Eyes: No inflammation or discharge of conjunctiva or lids; sclera clear   Pharynx: no " inflammation, lesion, or mass of lips  Pulmonary/Chest: Effort normal. No respiratory distress.     Ortho Exam   Foot/Ankle Examination (focused):     Gait: Weightbearing in cam boot today without antalgia      RIGHT   Inspection Erythema none    Edema None    Ecchymosis none    Other Toes without crossover/malrotation   ROM:   Foot plantarflexion 50, dorsiflexion 10             Strength Pronation 4/5    Supination 5/5    Foot plantarflexion 5/5    Foot dorsflexion 5/5        TTP AiTFL no    ATFL No    CFL no    PTFL no    Achilles no    Deltoid no    Peroneal + Over is a general lateral aspect of foot    Tib Ant no    Tib Post no        TTP (Bony)    + Fourth and fifth toes as well as general lateral aspect of her foot but nontender over the lateral malleoli/medial malleoli, talar dome, proximal fibula        Anterior Drawer ATFL Negative   Calcaneal Squeeze  negative   Tib-Fib Squeeze Test  negative   Talar Tilt (stab tib,DF foot,invert foot) CFL Negative   MT Compression  Positive (general lateral aspect of foot)         No calf tenderness to palpation     LE NV Exam: +2 DP/PT pulses   Sensation intact to light touch          Procedures

## 2024-08-13 ENCOUNTER — OFFICE VISIT (OUTPATIENT)
Dept: URGENT CARE | Facility: CLINIC | Age: 39
End: 2024-08-13
Payer: COMMERCIAL

## 2024-08-13 ENCOUNTER — APPOINTMENT (OUTPATIENT)
Dept: RADIOLOGY | Facility: CLINIC | Age: 39
End: 2024-08-13
Payer: COMMERCIAL

## 2024-08-13 VITALS
OXYGEN SATURATION: 96 % | WEIGHT: 196 LBS | SYSTOLIC BLOOD PRESSURE: 172 MMHG | TEMPERATURE: 97.9 F | HEIGHT: 63 IN | DIASTOLIC BLOOD PRESSURE: 79 MMHG | HEART RATE: 104 BPM | RESPIRATION RATE: 16 BRPM | BODY MASS INDEX: 34.73 KG/M2

## 2024-08-13 DIAGNOSIS — J40 BRONCHITIS: Primary | ICD-10-CM

## 2024-08-13 DIAGNOSIS — J06.9 VIRAL UPPER RESPIRATORY TRACT INFECTION: ICD-10-CM

## 2024-08-13 LAB — S PYO AG THROAT QL: NEGATIVE

## 2024-08-13 PROCEDURE — 71046 X-RAY EXAM CHEST 2 VIEWS: CPT

## 2024-08-13 PROCEDURE — 99213 OFFICE O/P EST LOW 20 MIN: CPT

## 2024-08-13 PROCEDURE — 87880 STREP A ASSAY W/OPTIC: CPT

## 2024-08-13 RX ORDER — PREDNISONE 20 MG/1
40 TABLET ORAL DAILY
Qty: 10 TABLET | Refills: 0 | Status: SHIPPED | OUTPATIENT
Start: 2024-08-13 | End: 2024-08-18

## 2024-08-13 RX ORDER — AZITHROMYCIN 250 MG/1
TABLET, FILM COATED ORAL
Qty: 6 TABLET | Refills: 0 | Status: SHIPPED | OUTPATIENT
Start: 2024-08-13 | End: 2024-08-17

## 2024-08-13 RX ORDER — BENZONATATE 100 MG/1
100 CAPSULE ORAL 3 TIMES DAILY PRN
Qty: 20 CAPSULE | Refills: 0 | Status: SHIPPED | OUTPATIENT
Start: 2024-08-13 | End: 2024-08-20

## 2024-08-13 NOTE — PROGRESS NOTES
"  [facility]        NAME: Lauren Best is a 38 y.o. female  : 1985    MRN: [id number]  DATE: 2024  TIME: 7:52 PM    Assessment and Plan   Bronchitis [J40]  1. Bronchitis  POCT rapid ANTIGEN strepA    XR chest pa & lateral    predniSONE 20 mg tablet    azithromycin (ZITHROMAX) 250 mg tablet    benzonatate (TESSALON PERLES) 100 mg capsule        VSS. Rapid POC strep testing negative. Repeat COVID testing deferred. Preliminary chest XR read negative, final read pending. Will treat with Azithromycin, oral steroids, and Tessalon Perles PRN. Albuterol inhaler and nebulizer PRN. Encouraged continued supportive measures.  Follow up with PCP in 3-5 days or proceed to emergency department for worsening symptoms.  Patient verbalized understanding of instructions given.         Patient Instructions     Patient Instructions     Preliminary chest XR read negative, final read pending  Take antibiotic as prescribed  Take oral steroids as prescribed  Use inhaler and nebulizer as needed for wheezing   Continue with supportive measures, OTC Tylenol/Ibuprofen, nasal decongestants, and cough suppressants (Tessalon Perles)   Cool mist humidifiers, throat lozenges, increased fluid intake and rest   Follow up with PCP in 3-5 days  Present to ER if symptoms worsen       If tests have been performed at [facility], our office will contact you with results if changes need to be made to the care plan discussed with you at the visit.  [facility]      Patient Education     Acute bronchitis in adults   The Basics   Written by the doctors and editors at [facility]   What is bronchitis? -- This is an infection that causes a cough. It happens when the tubes that carry air into the lungs, called the \"bronchi,\" get infected (figure 1).  Usually, bronchitis happens after a person gets a cold or the flu. The viruses that cause the cold or flu infect the bronchi and irritate them. " "Antibiotics do not help bronchitis.  Bronchitis can also happen when a person gets an infection called \"whooping cough,\" but this is much less common. Whooping cough is caused by bacteria that can infect the bronchi. Most people get vaccines to prevent whooping cough, but the vaccine doesn't always work. Your doctor will be able to tell if you have whooping cough by doing an exam and listening to your cough.  This article is about \"acute\" bronchitis. This is different from \"chronic\" bronchitis, which is a lung disease that most often affects people who smoke.  What are the symptoms of bronchitis? -- The most common symptoms are:   A cough that can last up to a few weeks   Coughing up mucus that is clear, yellow, or green - Green mucus does not always mean that you have a bacterial infection.  You might also have other cold or flu symptoms, like a stuffy nose, sore throat, or headache. People with bronchitis do not usually get a fever.  Will I need tests? -- Most people with bronchitis do not need a test. But if your doctor or nurse is not sure what is causing your cough, they might do tests. For example, they might order a chest X-ray. Or if they think that you might have COVID-19, they will test you for the virus that causes the infection.  How is bronchitis treated? -- Bronchitis almost always goes away on its own. But the cough can take up to 3 weeks to get better, and sometimes even longer.  Doctors do not usually treat bronchitis with antibiotic medicines. That's because bronchitis is usually caused by a virus, and antibiotics kill bacteria, not viruses. Also, antibiotics can actually cause other problems.  To feel better, you can treat your cold and flu symptoms. You can:   Get lots of rest, and drink plenty of liquids.   Drink hot tea.   Suck on cough drops or hard candy.   Take over-the-counter cough and cold medicines.   Breath in warm, moist air, such as in the shower, over a kettle, or from a " "humidifier.   Take a pain-relieving medicine if you have cold or flu symptoms like headache, muscle aches, or joint pain.  Avoid smoking or being around others who smoke. This can make your cough worse.  How can I keep from getting bronchitis again? -- You can reduce your chance of getting bronchitis again by keeping the germs that cause bronchitis out of your body. One of the best ways to do this is to wash your hands often with soap and water. If there is no sink nearby, you can use a hand gel with alcohol in it to clean your hands.  How can I keep from spreading germs? -- In addition to washing your hands often, cover your mouth with your elbow when you sneeze or cough. Using your elbow keeps you from getting germs on your hands. If you use a tissue, throw the tissue away and wash your hands.  When should I call the doctor? -- Call for advice if you have:   A fever higher than 100.4°F (38°C), or chills   Chest pain when you cough, trouble breathing, or coughing up blood   A barking cough that makes it hard to talk   Cough and weight loss that you cannot explain   Symptoms that are not getting better after 3 weeks  All topics are updated as new evidence becomes available and our peer review process is complete.  This topic retrieved from [employer] on: May 16, 2024.  Topic 80891 Version 17.0  Release: 32.4.3 - C32.135  © 2024 UpToDate, Inc. and/or its affiliates. All rights reserved.  figure 1: Normal lungs     The lungs sit in the chest, inside the ribcage. They are covered with a thin membrane called the \"pleura.\" The windpipe, or trachea, branches into 2 smaller airways called the left and right \"bronchi.\" The space between the lungs is called the \"mediastinum.\" Lymph nodes are located within and around the lungs and mediastinum.  Graphic 52731 Version 14.0  Consumer Information Use and Disclaimer   Disclaimer: This generalized information is a limited summary of diagnosis, treatment, and/or medication " information. It is not meant to be comprehensive and should be used as a tool to help the user understand and/or assess potential diagnostic and treatment options. It does NOT include all information about conditions, treatments, medications, side effects, or risks that may apply to a specific patient. It is not intended to be medical advice or a substitute for the medical advice, diagnosis, or treatment of a health care provider based on the health care provider's examination and assessment of a patient's specific and unique circumstances. Patients must speak with a health care provider for complete information about their health, medical questions, and treatment options, including any risks or benefits regarding use of medications. This information does not endorse any treatments or medications as safe, effective, or approved for treating a specific patient. UpToDate, Inc. and its affiliates disclaim any warranty or liability relating to this information or the use thereof.The use of this information is governed by the Terms of Use, available at https://www.[employer]/en/know/clinical-effectiveness-terms. 2024© UpToDate, Inc. and its affiliates and/or licensors. All rights reserved.  Copyright   © 2024 UpToDate, Inc. and/or its affiliates. All rights reserved.      Chief Complaint     Chief Complaint   Patient presents with    Cold Like Symptoms     Fevers, chills, sore throat, body aches  Started: Saturday;  At home covid test was negative yesterday            History of Present Illness       38-year-old female with a past medical history significant for COPD and asthma presents with complaints of fever, chills, nasal congestion, sore throat, cough, body aches, shortness of breath, and wheezing x 4-5 days.  Patient reports cough is productive and occasional blood-tinged mucus.  Tmax 101.  No vomiting or diarrhea.  Negative at home COVID testing yesterday.  States positive sick contact/exposure as other  coworkers currently ill with similar symptoms. She has been using her albuterol inhaler and nebulizer as needed but has not used these medications at all today. Current smoker.         Review of Systems   Review of Systems   Constitutional:  Positive for chills and fever.   HENT:  Positive for congestion, rhinorrhea and sore throat. Negative for ear discharge, ear pain, trouble swallowing and voice change.    Eyes:  Negative for discharge.   Respiratory:  Positive for cough, shortness of breath and wheezing.    Cardiovascular:  Negative for chest pain.   Gastrointestinal:  Negative for abdominal pain, diarrhea, nausea and vomiting.   Musculoskeletal:  Positive for myalgias.   Skin:  Negative for rash.         Current Medications       Current Outpatient Medications:     albuterol (2.5 mg/3 mL) 0.083 % nebulizer solution, Take 3 mL (2.5 mg total) by nebulization every 4 (four) hours as needed for wheezing, Disp: 240 mL, Rfl: 0    albuterol (PROVENTIL HFA,VENTOLIN HFA) 90 mcg/act inhaler, Inhale 2 puffs, Disp: , Rfl:     azithromycin (ZITHROMAX) 250 mg tablet, Take 2 tablets today then 1 tablet daily x 4 days, Disp: 6 tablet, Rfl: 0    benzonatate (TESSALON PERLES) 100 mg capsule, Take 1 capsule (100 mg total) by mouth 3 (three) times a day as needed for cough for up to 7 days, Disp: 20 capsule, Rfl: 0    cyclobenzaprine (FLEXERIL) 10 mg tablet, Take 10 mg by mouth, Disp: , Rfl:     LORazepam (ATIVAN) 1 mg tablet, Take 1 mg by mouth, Disp: , Rfl:     predniSONE 20 mg tablet, Take 2 tablets (40 mg total) by mouth daily for 5 days, Disp: 10 tablet, Rfl: 0    topiramate (TOPAMAX) 100 mg tablet, Take 100 mg by mouth, Disp: , Rfl:     Ubrogepant (Ubrelvy) 100 MG tablet, Take 100 mg by mouth, Disp: , Rfl:     budesonide-formoterol (Symbicort) 160-4.5 mcg/act inhaler, Inhale 2 puffs (Patient not taking: Reported on 6/19/2024), Disp: , Rfl:     Cholecalciferol (Vitamin D3) POWD, Take by mouth (Patient not taking: Reported on  2024), Disp: , Rfl:     clobetasol (TEMOVATE) 0.05 % cream, , Disp: , Rfl:     fluticasone (Flovent HFA) 220 mcg/act inhaler, 2 inhalations twice a day.  Rinse mouth after use. (Patient not taking: Reported on 10/21/2023), Disp: 12 g, Rfl: 0    guaifenesin-codeine (GUAIFENESIN AC) 100-10 MG/5ML liquid, 5 mL q 6 hours or hs prn cough.  Home use only. (Patient not taking: Reported on 10/21/2023), Disp: 120 mL, Rfl: 0    hydroCHLOROthiazide 25 mg tablet, , Disp: , Rfl:     ibuprofen (MOTRIN) 600 mg tablet, Take 1 tablet (600 mg total) by mouth every 8 (eight) hours as needed for mild pain (Patient not taking: Reported on 2024), Disp: 30 tablet, Rfl: 0    levETIRAcetam (KEPPRA) 250 mg tablet, , Disp: , Rfl:     montelukast (Singulair) 10 mg tablet, Take by mouth (Patient not taking: Reported on 2024), Disp: , Rfl:     naproxen (NAPROSYN) 500 mg tablet, Take 1 tablet (500 mg total) by mouth 2 (two) times a day with meals Take 1 tab PO BID for one week. Then only as needed afterwards. (Patient not taking: Reported on 2024), Disp: 40 tablet, Rfl: 0    permethrin (ELIMITE) 5 % cream, APPLY TOPICALLY ONCE FOR 1 DOSE. (Patient not taking: Reported on 2024), Disp: , Rfl:     rizatriptan (Maxalt) 10 mg tablet, , Disp: , Rfl:     venlafaxine (EFFEXOR) 37.5 mg tablet, , Disp: , Rfl:     Current Allergies     Allergies as of 2024 - Reviewed 2024   Allergen Reaction Noted    Penicillin g Hives 2024    Penicillins Hives 2013            The following portions of the patient's history were reviewed and updated as appropriate: allergies, current medications, past family history, past medical history, past social history, past surgical history and problem list.     Past Medical History:   Diagnosis Date    Anxiety     Asthma     COPD (chronic obstructive pulmonary disease) (HCC)     Migraines        Past Surgical History:   Procedure Laterality Date     SECTION      HAND TENDON  "SURGERY      WISDOM TOOTH EXTRACTION         Family History   Problem Relation Age of Onset    Heart disease Mother     Diabetes Mother     Cancer Father          Medications have been verified.        Objective   BP (!) 172/79   Pulse 104   Temp 97.9 °F (36.6 °C)   Resp 16   Ht 5' 3\" (1.6 m)   Wt 88.9 kg (196 lb)   SpO2 96%   BMI 34.72 kg/m²   No LMP recorded.       Physical Exam     Physical Exam  Vitals and nursing note reviewed.   Constitutional:       General: She is not in acute distress.     Appearance: She is not toxic-appearing.   HENT:      Head: Normocephalic.      Right Ear: Tympanic membrane, ear canal and external ear normal.      Left Ear: Tympanic membrane, ear canal and external ear normal.      Nose: Congestion present.      Mouth/Throat:      Mouth: Mucous membranes are moist.      Pharynx: Posterior oropharyngeal erythema present.      Tonsils: No tonsillar exudate. 0 on the right. 0 on the left.   Eyes:      Conjunctiva/sclera: Conjunctivae normal.   Cardiovascular:      Rate and Rhythm: Normal rate and regular rhythm.      Heart sounds: Normal heart sounds.   Pulmonary:      Effort: Pulmonary effort is normal. No respiratory distress.      Breath sounds: No stridor. Examination of the right-upper field reveals wheezing. Examination of the left-upper field reveals wheezing. Examination of the right-lower field reveals wheezing. Examination of the left-lower field reveals wheezing. Wheezing present. No rhonchi or rales.      Comments: Faint expiratory wheezing in all lung fields  Lymphadenopathy:      Cervical: No cervical adenopathy.   Skin:     General: Skin is warm and dry.   Neurological:      Mental Status: She is alert and oriented to person, place, and time.      Gait: Gait is intact.   Psychiatric:         Mood and Affect: Mood normal.         Behavior: Behavior normal.                   "

## 2024-08-13 NOTE — PATIENT INSTRUCTIONS
"  Preliminary chest XR read negative, final read pending  Take antibiotic as prescribed  Take oral steroids as prescribed  Use inhaler and nebulizer as needed for wheezing   Continue with supportive measures, OTC Tylenol/Ibuprofen, nasal decongestants, and cough suppressants (Tessalon Perles)   Cool mist humidifiers, throat lozenges, increased fluid intake and rest   Follow up with PCP in 3-5 days  Present to ER if symptoms worsen       If tests have been performed at [facility], our office will contact you with results if changes need to be made to the care plan discussed with you at the visit.  You can review your full results on [facility].      Patient Education     Acute bronchitis in adults   The Basics   Written by the doctors and editors at [facility]   What is bronchitis? -- This is an infection that causes a cough. It happens when the tubes that carry air into the lungs, called the \"bronchi,\" get infected (figure 1).  Usually, bronchitis happens after a person gets a cold or the flu. The viruses that cause the cold or flu infect the bronchi and irritate them. Antibiotics do not help bronchitis.  Bronchitis can also happen when a person gets an infection called \"whooping cough,\" but this is much less common. Whooping cough is caused by bacteria that can infect the bronchi. Most people get vaccines to prevent whooping cough, but the vaccine doesn't always work. Your doctor will be able to tell if you have whooping cough by doing an exam and listening to your cough.  This article is about \"acute\" bronchitis. This is different from \"chronic\" bronchitis, which is a lung disease that most often affects people who smoke.  What are the symptoms of bronchitis? -- The most common symptoms are:   A cough that can last up to a few weeks   Coughing up mucus that is clear, yellow, or green - Green mucus does not always mean that you have a bacterial infection.  You might also have other cold or flu symptoms, like a " stuffy nose, sore throat, or headache. People with bronchitis do not usually get a fever.  Will I need tests? -- Most people with bronchitis do not need a test. But if your doctor or nurse is not sure what is causing your cough, they might do tests. For example, they might order a chest X-ray. Or if they think that you might have COVID-19, they will test you for the virus that causes the infection.  How is bronchitis treated? -- Bronchitis almost always goes away on its own. But the cough can take up to 3 weeks to get better, and sometimes even longer.  Doctors do not usually treat bronchitis with antibiotic medicines. That's because bronchitis is usually caused by a virus, and antibiotics kill bacteria, not viruses. Also, antibiotics can actually cause other problems.  To feel better, you can treat your cold and flu symptoms. You can:   Get lots of rest, and drink plenty of liquids.   Drink hot tea.   Suck on cough drops or hard candy.   Take over-the-counter cough and cold medicines.   Breath in warm, moist air, such as in the shower, over a kettle, or from a humidifier.   Take a pain-relieving medicine if you have cold or flu symptoms like headache, muscle aches, or joint pain.  Avoid smoking or being around others who smoke. This can make your cough worse.  How can I keep from getting bronchitis again? -- You can reduce your chance of getting bronchitis again by keeping the germs that cause bronchitis out of your body. One of the best ways to do this is to wash your hands often with soap and water. If there is no sink nearby, you can use a hand gel with alcohol in it to clean your hands.  How can I keep from spreading germs? -- In addition to washing your hands often, cover your mouth with your elbow when you sneeze or cough. Using your elbow keeps you from getting germs on your hands. If you use a tissue, throw the tissue away and wash your hands.  When should I call the doctor? -- Call for advice if you  "have:   A fever higher than 100.4°F (38°C), or chills   Chest pain when you cough, trouble breathing, or coughing up blood   A barking cough that makes it hard to talk   Cough and weight loss that you cannot explain   Symptoms that are not getting better after 3 weeks  All topics are updated as new evidence becomes available and our peer review process is complete.  This topic retrieved from [employer] on: May 16, 2024.  Topic 85832 Version 17.0  Release: 32.4.3 - C32.135  © 2024 UpToDate, Inc. and/or its affiliates. All rights reserved.  figure 1: Normal lungs     The lungs sit in the chest, inside the ribcage. They are covered with a thin membrane called the \"pleura.\" The windpipe, or trachea, branches into 2 smaller airways called the left and right \"bronchi.\" The space between the lungs is called the \"mediastinum.\" Lymph nodes are located within and around the lungs and mediastinum.  Graphic 17901 Version 14.0  Consumer Information Use and Disclaimer   Disclaimer: This generalized information is a limited summary of diagnosis, treatment, and/or medication information. It is not meant to be comprehensive and should be used as a tool to help the user understand and/or assess potential diagnostic and treatment options. It does NOT include all information about conditions, treatments, medications, side effects, or risks that may apply to a specific patient. It is not intended to be medical advice or a substitute for the medical advice, diagnosis, or treatment of a health care provider based on the health care provider's examination and assessment of a patient's specific and unique circumstances. Patients must speak with a health care provider for complete information about their health, medical questions, and treatment options, including any risks or benefits regarding use of medications. This information does not endorse any treatments or medications as safe, effective, or approved for treating a specific patient. " UpToDate, Inc. and its affiliates disclaim any warranty or liability relating to this information or the use thereof.The use of this information is governed by the Terms of Use, available at https://www.w[employer]kluwer.com/en/know/clinical-effectiveness-terms. 2024© UpToDate, Inc. and its affiliates and/or licensors. All rights reserved.  Copyright   © 2024 UpToDate, Inc. and/or its affiliates. All rights reserved.

## 2024-08-14 ENCOUNTER — HOSPITAL ENCOUNTER (EMERGENCY)
Facility: HOSPITAL | Age: 39
Discharge: HOME | End: 2024-08-14
Attending: EMERGENCY MEDICINE
Payer: COMMERCIAL

## 2024-08-14 ENCOUNTER — APPOINTMENT (EMERGENCY)
Dept: RADIOLOGY | Facility: HOSPITAL | Age: 39
End: 2024-08-14
Payer: COMMERCIAL

## 2024-08-14 VITALS
HEART RATE: 91 BPM | HEIGHT: 63 IN | SYSTOLIC BLOOD PRESSURE: 156 MMHG | BODY MASS INDEX: 34.77 KG/M2 | RESPIRATION RATE: 21 BRPM | WEIGHT: 196.21 LBS | DIASTOLIC BLOOD PRESSURE: 74 MMHG | TEMPERATURE: 98.2 F | OXYGEN SATURATION: 97 %

## 2024-08-14 DIAGNOSIS — J01.00 ACUTE NON-RECURRENT MAXILLARY SINUSITIS: Primary | ICD-10-CM

## 2024-08-14 LAB
ALBUMIN SERPL BCG-MCNC: 4.2 G/DL (ref 3.5–5)
ALP SERPL-CCNC: 83 U/L (ref 34–104)
ALT SERPL W P-5'-P-CCNC: 9 U/L (ref 7–52)
ANION GAP SERPL CALCULATED.3IONS-SCNC: 8 MMOL/L (ref 4–13)
AST SERPL W P-5'-P-CCNC: 10 U/L (ref 13–39)
BASOPHILS # BLD AUTO: 0.06 THOUSANDS/ÂΜL (ref 0–0.1)
BASOPHILS NFR BLD AUTO: 0 % (ref 0–1)
BILIRUB SERPL-MCNC: 0.7 MG/DL (ref 0.2–1)
BUN SERPL-MCNC: 5 MG/DL (ref 5–25)
CALCIUM SERPL-MCNC: 9.4 MG/DL (ref 8.4–10.2)
CHLORIDE SERPL-SCNC: 101 MMOL/L (ref 96–108)
CO2 SERPL-SCNC: 26 MMOL/L (ref 21–32)
CREAT SERPL-MCNC: 0.82 MG/DL (ref 0.6–1.3)
EOSINOPHIL # BLD AUTO: 0.04 THOUSAND/ÂΜL (ref 0–0.61)
EOSINOPHIL NFR BLD AUTO: 0 % (ref 0–6)
ERYTHROCYTE [DISTWIDTH] IN BLOOD BY AUTOMATED COUNT: 13 % (ref 11.6–15.1)
FLUAV RNA RESP QL NAA+PROBE: NEGATIVE
FLUBV RNA RESP QL NAA+PROBE: NEGATIVE
GFR SERPL CREATININE-BSD FRML MDRD: 89 ML/MIN/1.73SQ M
GLUCOSE SERPL-MCNC: 110 MG/DL (ref 65–140)
HCT VFR BLD AUTO: 41.2 % (ref 34.8–46.1)
HGB BLD-MCNC: 13.5 G/DL (ref 11.5–15.4)
IMM GRANULOCYTES # BLD AUTO: 0.1 THOUSAND/UL (ref 0–0.2)
IMM GRANULOCYTES NFR BLD AUTO: 1 % (ref 0–2)
LYMPHOCYTES # BLD AUTO: 2.49 THOUSANDS/ÂΜL (ref 0.6–4.47)
LYMPHOCYTES NFR BLD AUTO: 12 % (ref 14–44)
MCH RBC QN AUTO: 28.8 PG (ref 26.8–34.3)
MCHC RBC AUTO-ENTMCNC: 32.8 G/DL (ref 31.4–37.4)
MCV RBC AUTO: 88 FL (ref 82–98)
MONOCYTES # BLD AUTO: 1.23 THOUSAND/ÂΜL (ref 0.17–1.22)
MONOCYTES NFR BLD AUTO: 6 % (ref 4–12)
NEUTROPHILS # BLD AUTO: 16.21 THOUSANDS/ÂΜL (ref 1.85–7.62)
NEUTS SEG NFR BLD AUTO: 81 % (ref 43–75)
NRBC BLD AUTO-RTO: 0 /100 WBCS
PLATELET # BLD AUTO: 359 THOUSANDS/UL (ref 149–390)
PMV BLD AUTO: 9.4 FL (ref 8.9–12.7)
POTASSIUM SERPL-SCNC: 3.4 MMOL/L (ref 3.5–5.3)
PROT SERPL-MCNC: 7.9 G/DL (ref 6.4–8.4)
RBC # BLD AUTO: 4.69 MILLION/UL (ref 3.81–5.12)
RSV RNA RESP QL NAA+PROBE: NEGATIVE
S PYO DNA THROAT QL NAA+PROBE: NOT DETECTED
SARS-COV-2 RNA RESP QL NAA+PROBE: NEGATIVE
SODIUM SERPL-SCNC: 135 MMOL/L (ref 135–147)
WBC # BLD AUTO: 20.13 THOUSAND/UL (ref 4.31–10.16)

## 2024-08-14 PROCEDURE — 94640 AIRWAY INHALATION TREATMENT: CPT

## 2024-08-14 PROCEDURE — 85025 COMPLETE CBC W/AUTO DIFF WBC: CPT | Performed by: EMERGENCY MEDICINE

## 2024-08-14 PROCEDURE — 96361 HYDRATE IV INFUSION ADD-ON: CPT

## 2024-08-14 PROCEDURE — 99284 EMERGENCY DEPT VISIT MOD MDM: CPT | Performed by: EMERGENCY MEDICINE

## 2024-08-14 PROCEDURE — 36415 COLL VENOUS BLD VENIPUNCTURE: CPT | Performed by: EMERGENCY MEDICINE

## 2024-08-14 PROCEDURE — 96374 THER/PROPH/DIAG INJ IV PUSH: CPT

## 2024-08-14 PROCEDURE — 99283 EMERGENCY DEPT VISIT LOW MDM: CPT

## 2024-08-14 PROCEDURE — 80053 COMPREHEN METABOLIC PANEL: CPT | Performed by: EMERGENCY MEDICINE

## 2024-08-14 PROCEDURE — 96375 TX/PRO/DX INJ NEW DRUG ADDON: CPT

## 2024-08-14 PROCEDURE — 71046 X-RAY EXAM CHEST 2 VIEWS: CPT

## 2024-08-14 PROCEDURE — 0241U HB NFCT DS VIR RESP RNA 4 TRGT: CPT | Performed by: EMERGENCY MEDICINE

## 2024-08-14 PROCEDURE — 87651 STREP A DNA AMP PROBE: CPT | Performed by: EMERGENCY MEDICINE

## 2024-08-14 RX ORDER — DOXYCYCLINE 100 MG/1
100 CAPSULE ORAL ONCE
Status: COMPLETED | OUTPATIENT
Start: 2024-08-14 | End: 2024-08-14

## 2024-08-14 RX ORDER — ONDANSETRON 2 MG/ML
4 INJECTION INTRAMUSCULAR; INTRAVENOUS ONCE
Status: COMPLETED | OUTPATIENT
Start: 2024-08-14 | End: 2024-08-14

## 2024-08-14 RX ORDER — ACETAMINOPHEN 325 MG/1
650 TABLET ORAL ONCE
Status: COMPLETED | OUTPATIENT
Start: 2024-08-14 | End: 2024-08-14

## 2024-08-14 RX ORDER — DOXYCYCLINE 100 MG/1
100 CAPSULE ORAL 2 TIMES DAILY
Qty: 14 CAPSULE | Refills: 0 | Status: SHIPPED | OUTPATIENT
Start: 2024-08-14 | End: 2024-08-21

## 2024-08-14 RX ORDER — IPRATROPIUM BROMIDE AND ALBUTEROL SULFATE 2.5; .5 MG/3ML; MG/3ML
3 SOLUTION RESPIRATORY (INHALATION) ONCE
Status: COMPLETED | OUTPATIENT
Start: 2024-08-14 | End: 2024-08-14

## 2024-08-14 RX ORDER — KETOROLAC TROMETHAMINE 30 MG/ML
15 INJECTION, SOLUTION INTRAMUSCULAR; INTRAVENOUS ONCE
Status: COMPLETED | OUTPATIENT
Start: 2024-08-14 | End: 2024-08-14

## 2024-08-14 RX ADMIN — ONDANSETRON 4 MG: 2 INJECTION INTRAMUSCULAR; INTRAVENOUS at 18:17

## 2024-08-14 RX ADMIN — ACETAMINOPHEN 325MG 650 MG: 325 TABLET ORAL at 17:43

## 2024-08-14 RX ADMIN — DOXYCYCLINE 100 MG: 100 CAPSULE ORAL at 19:20

## 2024-08-14 RX ADMIN — IPRATROPIUM BROMIDE AND ALBUTEROL SULFATE 3 ML: .5; 3 SOLUTION RESPIRATORY (INHALATION) at 17:38

## 2024-08-14 RX ADMIN — SODIUM CHLORIDE 1000 ML: 0.9 INJECTION, SOLUTION INTRAVENOUS at 18:17

## 2024-08-14 RX ADMIN — KETOROLAC TROMETHAMINE 15 MG: 30 INJECTION, SOLUTION INTRAMUSCULAR; INTRAVENOUS at 18:17

## 2024-08-14 NOTE — ED PROVIDER NOTES
History  Chief Complaint   Patient presents with    Sore Throat    Generalized Body Aches     Patient states that on Monday 8/12 she started with a sore throat, body aches and nasal congestion. Reports some occurrences of coughing up bright red blood yesterday. Shortness of breath today.      Patient is a 38-year-old female presenting to the emergency department complaining of fever, chills, cough, congestion, diarrhea, she had an episode of diarrhea on Saturday but then no symptoms on Sunday, on Monday upper respiratory symptoms started, she did cough up a small amount of blood yesterday, her cough is otherwise productive of phlegm, she has had no vomiting but does have persistent nausea, body aches, pain in her legs, she does work in a healthcare facility and has had contact with several people with COVID-19 recently, states her symptoms are similar to when she had COVID in the past        Prior to Admission Medications   Prescriptions Last Dose Informant Patient Reported? Taking?   Cholecalciferol (Vitamin D3) POWD   Yes No   Sig: Take by mouth   Patient not taking: Reported on 6/19/2024   LORazepam (ATIVAN) 1 mg tablet   Yes No   Sig: Take 1 mg by mouth   Ubrogepant (Ubrelvy) 100 MG tablet   Yes No   Sig: Take 100 mg by mouth   albuterol (2.5 mg/3 mL) 0.083 % nebulizer solution   No No   Sig: Take 3 mL (2.5 mg total) by nebulization every 4 (four) hours as needed for wheezing   albuterol (PROVENTIL HFA,VENTOLIN HFA) 90 mcg/act inhaler   Yes No   Sig: Inhale 2 puffs   azithromycin (ZITHROMAX) 250 mg tablet   No No   Sig: Take 2 tablets today then 1 tablet daily x 4 days   benzonatate (TESSALON PERLES) 100 mg capsule   No No   Sig: Take 1 capsule (100 mg total) by mouth 3 (three) times a day as needed for cough for up to 7 days   budesonide-formoterol (Symbicort) 160-4.5 mcg/act inhaler   Yes No   Sig: Inhale 2 puffs   Patient not taking: Reported on 6/19/2024   clobetasol (TEMOVATE) 0.05 % cream   Yes No    Patient not taking: Reported on 2024   cyclobenzaprine (FLEXERIL) 10 mg tablet   Yes No   Sig: Take 10 mg by mouth   fluticasone (Flovent HFA) 220 mcg/act inhaler   No No   Si inhalations twice a day.  Rinse mouth after use.   Patient not taking: Reported on 10/21/2023   guaifenesin-codeine (GUAIFENESIN AC) 100-10 MG/5ML liquid   No No   Si mL q 6 hours or hs prn cough.  Home use only.   Patient not taking: Reported on 10/21/2023   hydroCHLOROthiazide 25 mg tablet   Yes No   Patient not taking: Reported on 2024   ibuprofen (MOTRIN) 600 mg tablet   No No   Sig: Take 1 tablet (600 mg total) by mouth every 8 (eight) hours as needed for mild pain   Patient not taking: Reported on 2024   levETIRAcetam (KEPPRA) 250 mg tablet   Yes No   Patient not taking: Reported on 2024   montelukast (Singulair) 10 mg tablet   Yes No   Sig: Take by mouth   Patient not taking: Reported on 2024   naproxen (NAPROSYN) 500 mg tablet   No No   Sig: Take 1 tablet (500 mg total) by mouth 2 (two) times a day with meals Take 1 tab PO BID for one week. Then only as needed afterwards.   Patient not taking: Reported on 2024   permethrin (ELIMITE) 5 % cream   Yes No   Sig: APPLY TOPICALLY ONCE FOR 1 DOSE.   Patient not taking: Reported on 2024   predniSONE 20 mg tablet   No No   Sig: Take 2 tablets (40 mg total) by mouth daily for 5 days   rizatriptan (Maxalt) 10 mg tablet   Yes No   Patient not taking: Reported on 2024   topiramate (TOPAMAX) 100 mg tablet   Yes No   Sig: Take 100 mg by mouth   venlafaxine (EFFEXOR) 37.5 mg tablet   Yes No   Patient not taking: Reported on 2024      Facility-Administered Medications: None       Past Medical History:   Diagnosis Date    Anxiety     Asthma     COPD (chronic obstructive pulmonary disease) (HCC)     Migraines        Past Surgical History:   Procedure Laterality Date     SECTION      HAND TENDON SURGERY      WISDOM TOOTH EXTRACTION          Family History   Problem Relation Age of Onset    Heart disease Mother     Diabetes Mother     Cancer Father      I have reviewed and agree with the history as documented.    E-Cigarette/Vaping    E-Cigarette Use Never User      E-Cigarette/Vaping Substances     Social History     Tobacco Use    Smoking status: Every Day     Current packs/day: 1.00     Types: Cigarettes    Smokeless tobacco: Never   Vaping Use    Vaping status: Never Used   Substance Use Topics    Alcohol use: Not Currently    Drug use: Never       Review of Systems   Constitutional:  Positive for chills, fatigue and fever.   HENT:  Positive for congestion.    Eyes: Negative.    Respiratory:  Positive for cough.    Cardiovascular: Negative.    Gastrointestinal:  Positive for diarrhea and nausea.   Endocrine: Negative.    Genitourinary: Negative.    Musculoskeletal:  Positive for myalgias.   Skin: Negative.    Allergic/Immunologic: Negative.    Neurological: Negative.    Hematological: Negative.    Psychiatric/Behavioral: Negative.         Physical Exam  Physical Exam  Constitutional:       Appearance: She is well-developed. She is ill-appearing.   HENT:      Head: Normocephalic and atraumatic.      Nose:      Right Sinus: Maxillary sinus tenderness present.      Left Sinus: Maxillary sinus tenderness present.   Eyes:      Conjunctiva/sclera: Conjunctivae normal.      Pupils: Pupils are equal, round, and reactive to light.   Cardiovascular:      Rate and Rhythm: Tachycardia present.   Pulmonary:      Effort: Pulmonary effort is normal.      Breath sounds: Wheezing present.   Abdominal:      Palpations: Abdomen is soft.   Musculoskeletal:         General: Normal range of motion.      Cervical back: Normal range of motion and neck supple.   Skin:     General: Skin is warm and dry.   Neurological:      Mental Status: She is alert and oriented to person, place, and time.         Vital Signs  ED Triage Vitals   Temperature Pulse Respirations Blood  Pressure SpO2   08/14/24 1719 08/14/24 1719 08/14/24 1719 08/14/24 1719 08/14/24 1719   100.3 °F (37.9 °C) 91 21 156/74 97 %      Temp Source Heart Rate Source Patient Position - Orthostatic VS BP Location FiO2 (%)   08/14/24 1719 08/14/24 1719 08/14/24 1719 08/14/24 1719 --   Temporal Monitor Lying Right arm       Pain Score       08/14/24 1743       8           Vitals:    08/14/24 1719   BP: 156/74   Pulse: 91   Patient Position - Orthostatic VS: Lying         Visual Acuity      ED Medications  Medications   acetaminophen (TYLENOL) tablet 650 mg (650 mg Oral Given 8/14/24 1743)   sodium chloride 0.9 % bolus 1,000 mL (0 mL Intravenous Stopped 8/14/24 1923)   ketorolac (TORADOL) injection 15 mg (15 mg Intravenous Given 8/14/24 1817)   ondansetron (ZOFRAN) injection 4 mg (4 mg Intravenous Given 8/14/24 1817)   ipratropium-albuterol (DUO-NEB) 0.5-2.5 mg/3 mL inhalation solution 3 mL (3 mL Nebulization Given 8/14/24 1738)   doxycycline hyclate (VIBRAMYCIN) capsule 100 mg (100 mg Oral Given 8/14/24 1920)       Diagnostic Studies  Results Reviewed       Procedure Component Value Units Date/Time    FLU/RSV/COVID - if FLU/RSV clinically relevant [[id number]]  (Normal) Collected: 08/14/24 1746    Lab Status: Final result Specimen: Nares from Nose Updated: 08/14/24 1904     SARS-CoV-2 Negative     INFLUENZA A PCR Negative     INFLUENZA B PCR Negative     RSV PCR Negative    Narrative:      This test has been performed using the CoV-2/Flu/RSV plus assay on the [employer] GeneXpert platform. This test has been validated by the  and verified by the performing laboratory.     This test is designed to amplify and detect the following: nucleocapsid (N), envelope (E), and RNA-dependent RNA polymerase (RdRP) genes of the SARS-CoV-2 genome; matrix (M), basic polymerase (PB2), and acidic protein (PA) segments of the influenza A genome; matrix (M) and non-structural protein (NS) segments of the influenza B genome, and the  nucleocapsid genes of RSV A and RSV B.     Positive results are indicative of the presence of Flu A, Flu B, RSV, and/or SARS-CoV-2 RNA. Positive results for SARS-CoV-2 or suspected novel influenza should be reported to state, local, or federal health departments according to local reporting requirements.      All results should be assessed in conjunction with clinical presentation and other laboratory markers for clinical management.     FOR PEDIATRIC PATIENTS - copy/paste COVID Guidelines URL to browser: [email or web address]       Strep A PCR [[id number]]  (Normal) Collected: 08/14/24 1746    Lab Status: Final result Specimen: Throat Updated: 08/14/24 1851     STREP A PCR Not Detected    Comprehensive metabolic panel [[id number]]  (Abnormal) Collected: 08/14/24 1818    Lab Status: Final result Specimen: Blood from Arm, Left Updated: 08/14/24 1842     Sodium 135 mmol/L      Potassium 3.4 mmol/L      Chloride 101 mmol/L      CO2 26 mmol/L      ANION GAP 8 mmol/L      BUN 5 mg/dL      Creatinine 0.82 mg/dL      Glucose 110 mg/dL      Calcium 9.4 mg/dL      AST 10 U/L      ALT 9 U/L      Alkaline Phosphatase 83 U/L      Total Protein 7.9 g/dL      Albumin 4.2 g/dL      Total Bilirubin 0.70 mg/dL      eGFR 89 ml/min/1.73sq m     Narrative:      National Kidney Disease Foundation guidelines for Chronic Kidney Disease (CKD):     Stage 1 with normal or high GFR (GFR > 90 mL/min/1.73 square meters)    Stage 2 Mild CKD (GFR = 60-89 mL/min/1.73 square meters)    Stage 3A Moderate CKD (GFR = 45-59 mL/min/1.73 square meters)    Stage 3B Moderate CKD (GFR = 30-44 mL/min/1.73 square meters)    Stage 4 Severe CKD (GFR = 15-29 mL/min/1.73 square meters)    Stage 5 End Stage CKD (GFR <15 mL/min/1.73 square meters)  Note: GFR calculation is accurate only with a steady state creatinine    CBC and differential [[id number]]  (Abnormal) Collected: 08/14/24 1818    Lab Status: Final  result Specimen: Blood from Arm, Left Updated: 08/14/24 1824     WBC 20.13 Thousand/uL      RBC 4.69 Million/uL      Hemoglobin 13.5 g/dL      Hematocrit 41.2 %      MCV 88 fL      MCH 28.8 pg      MCHC 32.8 g/dL      RDW 13.0 %      MPV 9.4 fL      Platelets 359 Thousands/uL      nRBC 0 /100 WBCs      Segmented % 81 %      Immature Grans % 1 %      Lymphocytes % 12 %      Monocytes % 6 %      Eosinophils Relative 0 %      Basophils Relative 0 %      Absolute Neutrophils 16.21 Thousands/µL      Absolute Immature Grans 0.10 Thousand/uL      Absolute Lymphocytes 2.49 Thousands/µL      Absolute Monocytes 1.23 Thousand/µL      Eosinophils Absolute 0.04 Thousand/µL      Basophils Absolute 0.06 Thousands/µL                    XR chest 2 views   ED Interpretation by Maris Cruz DO (08/14 1849)   No acute findings                 Procedures  Procedures         ED Course  ED Course as of 08/14/24 1934   Wed Aug 14, 2024   1932 Comprehensive metabolic panel(!)   1932 CBC and differential(!)   1933 FLU/RSV/COVID - if FLU/RSV clinically relevant   1933 Strep A PCR   1933 XR chest 2 views                                               Medical Decision Making  This patient presents with symptoms suspicious for likely bacterial sinusitis given patient's leukocytosis, fever and tenderness in the maxillary sinuses.  Nasal swab was sent for COVID, RSV and influenza testing which is negative.  Rapid strep negative as well.  Do not suspect any underlying cardiopulmonary process.  Chest x-ray shows no evidence of pneumonia.  Patient is nontoxic, in no acute distress and not in need of any emergent medical intervention.  Patient and/or parents told to continue good supportive care at home, started on oral antibiotics, advised to follow-up with PCP as needed or return if symptoms worsen      Problems Addressed:  Acute non-recurrent maxillary sinusitis: acute illness or injury    Amount and/or Complexity of Data Reviewed  Labs: ordered.  Decision-making details documented in ED Course.  Radiology: ordered and independent interpretation performed. Decision-making details documented in ED Course.    Risk  OTC drugs.  Prescription drug management.                 Disposition  Final diagnoses:   Acute non-recurrent maxillary sinusitis     Time reflects when diagnosis was documented in both MDM as applicable and the Disposition within this note       Time User Action Codes Description Comment    8/14/2024  7:11 PM Maris Cruz Add [J01.00] Acute non-recurrent maxillary sinusitis           ED Disposition       ED Disposition   Discharge    Condition   Stable    Date/Time   Wed Aug 14, 2024  7:11 PM    Comment   Lauren Nasir discharge to home/self care.                   Follow-up Information       Follow up With Specialties Details Why Contact Info    Dipak Gonzalez MD Family Medicine In 3 days  [address] [address]  [phone]              Patient's Medications   Discharge Prescriptions    DOXYCYCLINE HYCLATE (VIBRAMYCIN) 100 MG CAPSULE    Take 1 capsule (100 mg total) by mouth 2 (two) times a day for 7 days       Start Date: 8/14/2024 End Date: 8/21/2024       Order Dose: 100 mg       Quantity: 14 capsule    Refills: 0       No discharge procedures on file.    PDMP Review         Value Time User    PDMP Reviewed  Yes 1/8/2022 11:10 AM Adri Morton PA-C            ED Provider  Electronically Signed by             Maris Cruz DO  08/14/24 1934

## 2024-10-09 ENCOUNTER — HOSPITAL ENCOUNTER (OUTPATIENT)
Dept: RADIOLOGY | Facility: CLINIC | Age: 39
Discharge: HOME | End: 2024-10-09
Payer: COMMERCIAL

## 2024-10-09 ENCOUNTER — OFFICE VISIT (OUTPATIENT)
Dept: PODIATRY | Age: 39
End: 2024-10-09
Payer: COMMERCIAL

## 2024-10-09 VITALS
BODY MASS INDEX: 35.26 KG/M2 | DIASTOLIC BLOOD PRESSURE: 90 MMHG | WEIGHT: 199 LBS | OXYGEN SATURATION: 99 % | TEMPERATURE: 98.6 F | HEART RATE: 67 BPM | SYSTOLIC BLOOD PRESSURE: 138 MMHG | HEIGHT: 63 IN

## 2024-10-09 DIAGNOSIS — B35.3 TINEA PEDIS OF LEFT FOOT: ICD-10-CM

## 2024-10-09 DIAGNOSIS — S90.822A BLISTER OF LEFT FOOT, INITIAL ENCOUNTER: ICD-10-CM

## 2024-10-09 DIAGNOSIS — S90.822A BLISTER OF LEFT FOOT, INITIAL ENCOUNTER: Primary | ICD-10-CM

## 2024-10-09 DIAGNOSIS — Z72.0 TOBACCO ABUSE: ICD-10-CM

## 2024-10-09 PROCEDURE — 73630 X-RAY EXAM OF FOOT: CPT

## 2024-10-09 PROCEDURE — 99203 OFFICE O/P NEW LOW 30 MIN: CPT | Performed by: STUDENT IN AN ORGANIZED HEALTH CARE EDUCATION/TRAINING PROGRAM

## 2024-10-09 RX ORDER — TERBINAFINE HYDROCHLORIDE 250 MG/1
250 TABLET ORAL DAILY
Qty: 14 TABLET | Refills: 0 | Status: SHIPPED | OUTPATIENT
Start: 2024-10-09 | End: 2024-10-23

## 2024-10-09 RX ORDER — PREDNISONE 20 MG/1
20 TABLET ORAL 2 TIMES DAILY WITH MEALS
COMMUNITY
Start: 2024-10-03

## 2024-10-09 RX ORDER — DOXYCYCLINE 100 MG/1
100 TABLET ORAL 2 TIMES DAILY
COMMUNITY
Start: 2024-10-07 | End: 2024-10-21

## 2024-10-09 RX ORDER — KETOCONAZOLE 20 MG/ML
1 SHAMPOO TOPICAL 2 TIMES WEEKLY
Qty: 120 ML | Refills: 0 | Status: SHIPPED | OUTPATIENT
Start: 2024-10-10

## 2024-10-09 NOTE — PATIENT INSTRUCTIONS
Foot Pain Home Therapy    Wear supportive shoes at all times. Avoid flip-flops, flat sandals, barefoot walking (never walk barefoot, even at home). Generally avoid shoes that are too flexible and bend in the arch. Your shoes should only slightly bend in the toe area, not the middle. Running sneakers are often the best choice.  Supportive sneaker brands: Limon, On Cloud, Hoka, Altra, New Balance, Asics, Mizuno  [place] (discount if mention Dr referred)  [facility]et Feet [facility]   [place] [employer] [place]  Supportive daily shoe brands: Vionic, Orthofeet, Sowmya, Dansko, Chacos, Hollins, Teva, Birkenstock  Supportive home shoes: Lala Sin (recovery slides)  Look in to over the counter shoe inserts/arch supports such as Vasyli-Dananberg (remove tabs under 1st toe) arch supports. These are all available on [employer].com as well as their individual website's.   Bringg: Vasyli+Dananberg 1st Ray Orthotic, Medium, 1st Ray Function, Medium Density, Full-Length Insole, Heat Molding Optional, Best All Around Orthotic, Functional Biomechanical Control for Pain Relief, Black Yellow (48737) : Health & Household

## 2024-10-09 NOTE — PROGRESS NOTES
Ambulatory Visit  Name: Lauren Best      : 1985      MRN: [id number]  Encounter Provider: Sabi Ray DPM  Encounter Date: 10/9/2024   Encounter department: [facility] PODIATRY [place]    Assessment & Plan  Blister of left foot, initial encounter    Orders:    XR foot 3+ vw left; Future    terbinafine (LamISIL) 250 mg tablet; Take 1 tablet (250 mg total) by mouth daily for 14 days    Tobacco abuse         Tinea pedis of left foot    Orders:    terbinafine (LamISIL) 250 mg tablet; Take 1 tablet (250 mg total) by mouth daily for 14 days    ketoconazole (NIZORAL) 2 % shampoo; Apply 1 Application topically 2 (two) times a week To feet      Imaging Reviewed at this visit (I personally reviewed/independently interpreted images and reports in PACS)  XR left foot WB 3v 10/9/24: No acute osseous abnormalities noted. Elevation of 1st metatarsal.         PLAN:  I reviewed clinical exam and radiographic imaging (XR) with patient in detail today. I have discussed with the patient the pathophysiology of this diagnosis and reviewed how the examination correlates with this diagnosis.  PCP note from 10/3/24 reviewed  Patient has left foot interdigital scaling with pruritic small blister cluster which appears clinically like tinea pedis. I dicussed treatment for athletes foot/tinea pedis today. I rx'd oral terbinafine 250mg qd for 2 weeks. I discussed need for culture/biopsy next appt prn. Patient would not prefer this today  I discussed general foot hygiene such as keep feet clean and dry, wearing clean socks, washing socks in hot water + laundry , cleaning showers regularly, avoiding keeping shoes in dark/wark spots, using antifungal shoe spray, alternating shoes or replacing shoes all together  F/u in 1wk for recheck     History of Present Illness     Lauren Best is a 38 y.o. female who presents to clinic for left foot blister. Notes a itchy blister showed up on foot a few days ago. Notes  "body rash and recent diagnosis of Lyme dz, wondering if this has anything to do with that.       Review of Systems   Constitutional:  Negative for activity change, chills and fever.   HENT: Negative.     Respiratory:  Negative for cough, chest tightness and shortness of breath.    Cardiovascular:  Negative for chest pain and leg swelling.   Endocrine: Negative.    Genitourinary: Negative.    Neurological: Negative.  Negative for numbness.   Psychiatric/Behavioral: Negative.  Negative for agitation and behavioral problems.            Objective     /90 (BP Location: Left arm, Patient Position: Sitting, Cuff Size: Standard)   Pulse 67   Temp 98.6 °F (37 °C) (Temporal)   Ht 5' 3\" (1.6 m)   Wt 90.3 kg (199 lb)   SpO2 99%   BMI 35.25 kg/m²     Physical Exam  Vitals and nursing note reviewed.   Constitutional:       General: She is not in acute distress.     Appearance: She is well-developed.   Pulmonary:      Effort: Pulmonary effort is normal. No respiratory distress.   Musculoskeletal:         General: Tenderness (left plantar-medial foot blister cluster) present. No swelling.   Skin:     General: Skin is warm and dry.      Capillary Refill: Capillary refill takes less than 2 seconds.      Findings: No erythema.      Comments: Left interdigital pruritic scaling. Pruritic left plantar-medial foot clear-filled blister in small cluster   Neurological:      Mental Status: She is alert.   Psychiatric:         Mood and Affect: Mood normal.         "

## 2024-10-16 ENCOUNTER — OFFICE VISIT (OUTPATIENT)
Dept: PODIATRY | Age: 39
End: 2024-10-16
Payer: COMMERCIAL

## 2024-10-16 VITALS
OXYGEN SATURATION: 97 % | DIASTOLIC BLOOD PRESSURE: 82 MMHG | HEART RATE: 74 BPM | WEIGHT: 199 LBS | BODY MASS INDEX: 35.26 KG/M2 | HEIGHT: 63 IN | TEMPERATURE: 98.2 F | SYSTOLIC BLOOD PRESSURE: 128 MMHG

## 2024-10-16 DIAGNOSIS — S90.822D BLISTER OF LEFT FOOT, SUBSEQUENT ENCOUNTER: Primary | ICD-10-CM

## 2024-10-16 DIAGNOSIS — B35.3 TINEA PEDIS OF LEFT FOOT: ICD-10-CM

## 2024-10-16 PROCEDURE — 99212 OFFICE O/P EST SF 10 MIN: CPT | Performed by: STUDENT IN AN ORGANIZED HEALTH CARE EDUCATION/TRAINING PROGRAM

## 2024-10-16 NOTE — PROGRESS NOTES
"Ambulatory Visit  Name: Lauren Best      : 1985      MRN: [id number]  Encounter Provider: Sabi Ray DPM  Encounter Date: 10/16/2024   Encounter department: [facility] PODIATRY [place]    Assessment & Plan  Blister of left foot, subsequent encounter         Tinea pedis of left foot           Prior Imaging  XR left foot WB 3v 10/9/24: No acute osseous abnormalities noted. Elevation of 1st metatarsal.       PLAN:  Left foot interdigital scaling with pruritic small blister cluster which appears clinically like tinea pedis. This has improved on exam today. C/w oral terbinafine 250mg qd for 2 weeks. C/w topical antifungal foot wash 2x/wk.  I discussed general foot hygiene such as keep feet clean and dry, wearing clean socks, washing socks in hot water + laundry , cleaning showers regularly, avoiding keeping shoes in dark/wark spots, using antifungal shoe spray, alternating shoes or replacing shoes all together  F/u in 2wks for recheck     History of Present Illness     Lauren Best is a 38 y.o. female who presents to clinic for left foot blister f/u. Notes the fluid was gone a few days after being seen. Started po terbinafine and antifungal wash 2 days ago.     Initial HPI \"Notes a itchy blister showed up on foot a few days ago. Notes body rash and recent diagnosis of Lyme dz, wondering if this has anything to do with that.\"       Review of Systems   Constitutional:  Negative for activity change, chills and fever.   HENT: Negative.     Respiratory:  Negative for cough, chest tightness and shortness of breath.    Cardiovascular:  Negative for chest pain and leg swelling.   Endocrine: Negative.    Genitourinary: Negative.    Neurological: Negative.  Negative for numbness.   Psychiatric/Behavioral: Negative.  Negative for agitation and behavioral problems.            Objective     /82 (BP Location: Left arm, Patient Position: Sitting, Cuff Size: Standard)   Pulse 74   Temp " "98.2 °F (36.8 °C) (Temporal)   Ht 5' 3\" (1.6 m)   Wt 90.3 kg (199 lb)   SpO2 97%   BMI 35.25 kg/m²     Physical Exam  Vitals and nursing note reviewed.   Constitutional:       General: She is not in acute distress.     Appearance: She is well-developed.   Pulmonary:      Effort: Pulmonary effort is normal. No respiratory distress.   Musculoskeletal:         General: No swelling or tenderness (left plantar-medial foot blister cluster resolved).   Skin:     General: Skin is warm and dry.      Capillary Refill: Capillary refill takes less than 2 seconds.      Findings: No erythema.      Comments: Left interdigital pruritic scaling. Pruritic left plantar-medial foot clear-filled blister in small cluster now with fluid resolved.   Neurological:      Mental Status: She is alert.   Psychiatric:         Mood and Affect: Mood normal.         "

## 2024-10-30 ENCOUNTER — OFFICE VISIT (OUTPATIENT)
Dept: PODIATRY | Age: 39
End: 2024-10-30
Payer: COMMERCIAL

## 2024-10-30 VITALS
HEART RATE: 80 BPM | BODY MASS INDEX: 34.98 KG/M2 | TEMPERATURE: 98.1 F | OXYGEN SATURATION: 97 % | WEIGHT: 197.4 LBS | DIASTOLIC BLOOD PRESSURE: 82 MMHG | SYSTOLIC BLOOD PRESSURE: 122 MMHG | HEIGHT: 63 IN

## 2024-10-30 DIAGNOSIS — B35.3 TINEA PEDIS OF LEFT FOOT: Primary | ICD-10-CM

## 2024-10-30 PROCEDURE — 99213 OFFICE O/P EST LOW 20 MIN: CPT | Performed by: STUDENT IN AN ORGANIZED HEALTH CARE EDUCATION/TRAINING PROGRAM

## 2024-10-30 RX ORDER — NICOTINE 21 MG/24HR
1 PATCH, TRANSDERMAL 24 HOURS TRANSDERMAL EVERY 24 HOURS
COMMUNITY
Start: 2024-10-17 | End: 2024-11-16

## 2024-10-30 RX ORDER — LEVALBUTEROL TARTRATE 45 UG/1
2 AEROSOL, METERED ORAL EVERY 6 HOURS PRN
COMMUNITY
Start: 2024-10-18

## 2024-10-30 RX ORDER — FLUCONAZOLE 150 MG/1
150 TABLET ORAL ONCE
COMMUNITY
Start: 2024-10-18

## 2024-10-30 RX ORDER — TERBINAFINE HYDROCHLORIDE 250 MG/1
250 TABLET ORAL DAILY
Qty: 14 TABLET | Refills: 0 | Status: SHIPPED | OUTPATIENT
Start: 2024-10-30 | End: 2024-11-13

## 2024-10-30 NOTE — PROGRESS NOTES
"Ambulatory Visit  Name: Lauren Best      : 1985      MRN: [id number]  Encounter Provider: Sabi Ray DPM  Encounter Date: 10/30/2024   Encounter department: [facility] POD[facility] [place]    Assessment & Plan  Tinea pedis of left foot    Orders:    terbinafine (LamISIL) 250 mg tablet; Take 1 tablet (250 mg total) by mouth daily for 14 days      Prior Imaging  XR left foot WB 3v 10/9/24: No acute osseous abnormalities noted. Elevation of 1st metatarsal.       PLAN:  Left foot interdigital scaling with pruritic small blister cluster which appears clinically like tinea pedis. Blister has resolved on exam today however interdigital scaling still present. C/w oral terbinafine 250mg qd for another 2 weeks. C/w topical antifungal foot wash 2x/wk.  Stop antifungal powder OTC spray  I discussed general foot hygiene such as keep feet clean and dry, wearing clean socks, washing socks in hot water + laundry , cleaning showers regularly, avoiding keeping shoes in dark/wark spots, using antifungal shoe spray, alternating shoes or replacing shoes all together  F/u in 2wks for recheck     History of Present Illness     Lauren Best is a 38 y.o. female who presents to clinic for left foot blister f/u. Finished 2wks terbinafine but still notes some scaling. using antifungal wash 2x/wk. Using antifungal powder spray as well.     Initial HPI \"Notes a itchy blister showed up on foot a few days ago. Notes body rash and recent diagnosis of Lyme dz, wondering if this has anything to do with that.\"       Review of Systems   Constitutional:  Negative for activity change, chills and fever.   HENT: Negative.     Respiratory:  Negative for cough, chest tightness and shortness of breath.    Cardiovascular:  Negative for chest pain and leg swelling.   Endocrine: Negative.    Genitourinary: Negative.    Neurological: Negative.  Negative for numbness.   Psychiatric/Behavioral: Negative.  Negative for agitation " "and behavioral problems.            Objective     /82 (BP Location: Left arm, Patient Position: Sitting, Cuff Size: Standard)   Pulse 80   Temp 98.1 °F (36.7 °C) (Temporal)   Ht 5' 3\" (1.6 m)   Wt 89.5 kg (197 lb 6.4 oz)   SpO2 97%   BMI 34.97 kg/m²     Physical Exam  Vitals and nursing note reviewed.   Constitutional:       General: She is not in acute distress.     Appearance: She is well-developed.   Pulmonary:      Effort: Pulmonary effort is normal. No respiratory distress.   Musculoskeletal:         General: No swelling or tenderness (left plantar-medial foot blister cluster resolved).   Skin:     General: Skin is warm and dry.      Capillary Refill: Capillary refill takes less than 2 seconds.      Findings: No erythema.      Comments: Left interdigital pruritic scaling. Pruritic left plantar-medial foot clear-filled blister in small cluster now with fluid resolved.   Neurological:      Mental Status: She is alert.   Psychiatric:         Mood and Affect: Mood normal.         "

## 2024-11-14 ENCOUNTER — TELEPHONE (OUTPATIENT)
Dept: OBGYN CLINIC | Facility: CLINIC | Age: 39
End: 2024-11-14

## 2024-11-20 ENCOUNTER — OFFICE VISIT (OUTPATIENT)
Dept: PODIATRY | Age: 39
End: 2024-11-20
Payer: COMMERCIAL

## 2024-11-20 VITALS
HEIGHT: 63 IN | WEIGHT: 199 LBS | HEART RATE: 78 BPM | DIASTOLIC BLOOD PRESSURE: 86 MMHG | OXYGEN SATURATION: 98 % | BODY MASS INDEX: 35.26 KG/M2 | SYSTOLIC BLOOD PRESSURE: 124 MMHG | TEMPERATURE: 98.2 F

## 2024-11-20 DIAGNOSIS — B35.3 TINEA PEDIS OF LEFT FOOT: Primary | ICD-10-CM

## 2024-11-20 PROCEDURE — 99212 OFFICE O/P EST SF 10 MIN: CPT | Performed by: STUDENT IN AN ORGANIZED HEALTH CARE EDUCATION/TRAINING PROGRAM

## 2024-11-20 RX ORDER — TERBINAFINE HYDROCHLORIDE 250 MG/1
250 TABLET ORAL DAILY
COMMUNITY

## 2024-11-20 RX ORDER — CLOTRIMAZOLE 1 %
CREAM (GRAM) TOPICAL 2 TIMES DAILY
Qty: 60 G | Refills: 1 | Status: SHIPPED | OUTPATIENT
Start: 2024-11-20

## 2024-11-20 NOTE — PROGRESS NOTES
"Ambulatory Visit  Name: Lauren Best      : 1985      MRN: [id number]  Encounter Provider: Sabi Ray DPM  Encounter Date: 2024   Encounter department: [facility] PODIAT[facility] [place]    Assessment & Plan  Tinea pedis of left foot    Orders:    clotrimazole (LOTRIMIN) 1 % cream; Apply topically 2 (two) times a day To scaling of feet      Prior Imaging  XR left foot WB 3v 10/9/24: No acute osseous abnormalities noted. Elevation of 1st metatarsal.       PLAN:  Left foot interdigital scaling with pruritic small blister cluster which appears clinically like tinea pedis. Blister has resolved on exam today and interdigital scaling has improved after oral terbinafine 250mg qd for 4 weeks. I rx'd topical clotrimazole cream for BID use for 2wks then prn for maintenance.   C/w topical antifungal foot wash 2x/wk.  I discussed general foot hygiene such as keep feet clean and dry, wearing clean socks, washing socks in hot water + laundry , cleaning showers regularly, avoiding keeping shoes in dark/wark spots, using antifungal shoe spray, alternating shoes or replacing shoes all together  F/u prn, call if blistering appears.     History of Present Illness     Lauren Best is a 38 y.o. female who presents to clinic for left foot blister f/u. Finished 4wks terbinafine. using antifungal wash 2x/wk. No further blistering.     Initial HPI \"Notes a itchy blister showed up on foot a few days ago. Notes body rash and recent diagnosis of Lyme dz, wondering if this has anything to do with that.\"       Review of Systems   Constitutional:  Negative for activity change, chills and fever.   HENT: Negative.     Respiratory:  Negative for cough, chest tightness and shortness of breath.    Cardiovascular:  Negative for chest pain and leg swelling.   Endocrine: Negative.    Genitourinary: Negative.    Neurological: Negative.  Negative for numbness.   Psychiatric/Behavioral: Negative.  Negative for " "agitation and behavioral problems.            Objective     /86 (BP Location: Left arm, Patient Position: Sitting, Cuff Size: Standard)   Pulse 78   Temp 98.2 °F (36.8 °C) (Temporal)   Ht 5' 3\" (1.6 m)   Wt 90.3 kg (199 lb)   SpO2 98%   BMI 35.25 kg/m²     Physical Exam  Vitals and nursing note reviewed.   Constitutional:       General: She is not in acute distress.     Appearance: She is well-developed.   Pulmonary:      Effort: Pulmonary effort is normal. No respiratory distress.   Musculoskeletal:         General: No swelling or tenderness (left plantar-medial foot blister cluster resolved).   Skin:     General: Skin is warm and dry.      Capillary Refill: Capillary refill takes less than 2 seconds.      Findings: No erythema.      Comments: Left interdigital scant scaling. Pruritic left plantar-medial foot clear-filled blister in small cluster now with fluid resolved.   Neurological:      Mental Status: She is alert.   Psychiatric:         Mood and Affect: Mood normal.         "

## 2025-01-08 ENCOUNTER — OFFICE VISIT (OUTPATIENT)
Dept: URGENT CARE | Facility: CLINIC | Age: 40
End: 2025-01-08
Payer: COMMERCIAL

## 2025-01-08 VITALS
TEMPERATURE: 98.2 F | SYSTOLIC BLOOD PRESSURE: 152 MMHG | HEIGHT: 63 IN | BODY MASS INDEX: 36.21 KG/M2 | DIASTOLIC BLOOD PRESSURE: 76 MMHG | HEART RATE: 77 BPM | WEIGHT: 204.4 LBS | RESPIRATION RATE: 16 BRPM | OXYGEN SATURATION: 97 %

## 2025-01-08 DIAGNOSIS — H66.93 BILATERAL OTITIS MEDIA, UNSPECIFIED OTITIS MEDIA TYPE: Primary | ICD-10-CM

## 2025-01-08 PROCEDURE — 99213 OFFICE O/P EST LOW 20 MIN: CPT | Performed by: PHYSICIAN ASSISTANT

## 2025-01-08 RX ORDER — AZITHROMYCIN 250 MG/1
TABLET, FILM COATED ORAL
Qty: 6 TABLET | Refills: 0 | Status: SHIPPED | OUTPATIENT
Start: 2025-01-08 | End: 2025-01-12

## 2025-01-08 RX ORDER — DOXYCYCLINE 100 MG/1
100 TABLET ORAL 2 TIMES DAILY
COMMUNITY
Start: 2025-01-07 | End: 2025-01-10

## 2025-01-08 NOTE — PROGRESS NOTES
[facility]        NAME: Lauren Best is a 39 y.o. female  : 1985    MRN: [id number]  DATE: 2025  TIME: 12:37 PM    Assessment and Plan   Bilateral otitis media, unspecified otitis media type [H66.93]  1. Bilateral otitis media, unspecified otitis media type  azithromycin (ZITHROMAX) 250 mg tablet    Ambulatory Referral to Otolaryngology            Patient Instructions   Patient was educated on OTC Flonase for ear congestion.    Stop Doxycycline and start Azithromycin. Eat on Antibiotics.    ENT referral placed.     Follow up with PCP in 3-5 days.  Proceed to  ER if symptoms worsen.    If tests have been performed at [facility], our office will contact you with results if changes need to be made to the care plan discussed with you at the visit.  You can review your full results on [facility].    Chief Complaint     Chief Complaint   Patient presents with    Cold Like Symptoms     Cough and sinus congestion starting 5-6 weeks ago, worsening end of December. Loss of hearing and ear pain in right ear starting . Seen 1/2 tx with doxycycline and prednisone for bronchitis and ear infection, but symptoms are not improving.          History of Present Illness       Patient presents today complaining of right and left ear pain. Patient was seen by PCP on 25 who prescribed Doxycycline and prednisone 20 mg Burst dose with no relief. Admits allergy to Penicillins and Amoxicillins.    Admits history of asthma and COPD. Follows with Pulm        Review of Systems   Review of Systems   Constitutional: Negative.    HENT:  Positive for ear pain.         Decreased hearing in right and left ear   Respiratory: Negative.     Cardiovascular: Negative.    Neurological: Negative.    Psychiatric/Behavioral: Negative.           Current Medications       Current Outpatient Medications:     albuterol (2.5 mg/3 mL) 0.083 % nebulizer solution, Take 3 mL (2.5 mg total) by nebulization every 4 (four)  hours as needed for wheezing, Disp: 240 mL, Rfl: 0    albuterol (PROVENTIL HFA,VENTOLIN HFA) 90 mcg/act inhaler, Inhale 2 puffs, Disp: , Rfl:     azithromycin (ZITHROMAX) 250 mg tablet, Take 2 tablets today then 1 tablet daily x 4 days, Disp: 6 tablet, Rfl: 0    Budeson-Glycopyrrol-Formoterol 160-9-4.8 MCG/ACT AERO, Inhale 2 puffs 2 (two) times a day, Disp: , Rfl:     cyclobenzaprine (FLEXERIL) 10 mg tablet, Take 10 mg by mouth, Disp: , Rfl:     doxycycline (ADOXA) 100 MG tablet, Take 100 mg by mouth 2 (two) times a day, Disp: , Rfl:     LORazepam (ATIVAN) 1 mg tablet, Take 1 mg by mouth, Disp: , Rfl:     predniSONE 20 mg tablet, Take 20 mg by mouth 2 (two) times a day with meals, Disp: , Rfl:     rizatriptan (Maxalt) 10 mg tablet, , Disp: , Rfl:     topiramate (TOPAMAX) 100 mg tablet, Take 100 mg by mouth, Disp: , Rfl:     Ubrogepant (Ubrelvy) 100 MG tablet, Take 100 mg by mouth, Disp: , Rfl:     budesonide-formoterol (Symbicort) 160-4.5 mcg/act inhaler, Inhale 2 puffs (Patient not taking: Reported on 1/8/2025), Disp: , Rfl:     Cholecalciferol (Vitamin D3) POWD, Take by mouth (Patient not taking: Reported on 1/8/2025), Disp: , Rfl:     clobetasol (TEMOVATE) 0.05 % cream, , Disp: , Rfl:     clotrimazole (LOTRIMIN) 1 % cream, Apply topically 2 (two) times a day To scaling of feet (Patient not taking: Reported on 1/8/2025), Disp: 60 g, Rfl: 1    fluconazole (DIFLUCAN) 150 mg tablet, Take 150 mg by mouth once (Patient not taking: Reported on 1/8/2025), Disp: , Rfl:     fluticasone (Flovent HFA) 220 mcg/act inhaler, 2 inhalations twice a day.  Rinse mouth after use. (Patient not taking: Reported on 1/8/2025), Disp: 12 g, Rfl: 0    guaifenesin-codeine (GUAIFENESIN AC) 100-10 MG/5ML liquid, 5 mL q 6 hours or hs prn cough.  Home use only. (Patient not taking: Reported on 1/8/2025), Disp: 120 mL, Rfl: 0    hydroCHLOROthiazide 25 mg tablet, , Disp: , Rfl:     ibuprofen (MOTRIN) 600 mg tablet, Take 1 tablet (600 mg total) by  mouth every 8 (eight) hours as needed for mild pain (Patient not taking: Reported on 2025), Disp: 30 tablet, Rfl: 0    ketoconazole (NIZORAL) 2 % shampoo, Apply 1 Application topically 2 (two) times a week To feet (Patient not taking: Reported on 2025), Disp: 120 mL, Rfl: 0    levalbuterol (XOPENEX HFA) 45 mcg/act inhaler, Inhale 2 puffs every 6 (six) hours as needed (Patient not taking: Reported on 2025), Disp: , Rfl:     levETIRAcetam (KEPPRA) 250 mg tablet, , Disp: , Rfl:     montelukast (Singulair) 10 mg tablet, Take by mouth (Patient not taking: Reported on 2025), Disp: , Rfl:     naproxen (NAPROSYN) 500 mg tablet, Take 1 tablet (500 mg total) by mouth 2 (two) times a day with meals Take 1 tab PO BID for one week. Then only as needed afterwards. (Patient not taking: Reported on 2025), Disp: 40 tablet, Rfl: 0    nicotine (NICODERM CQ) 21 mg/24 hr TD 24 hr patch, Place 1 patch on the skin every 24 hours (Patient not taking: Reported on 2024), Disp: , Rfl:     permethrin (ELIMITE) 5 % cream, , Disp: , Rfl:     terbinafine (LamISIL) 250 mg tablet, Take 250 mg by mouth daily (Patient not taking: Reported on 2025), Disp: , Rfl:     venlafaxine (EFFEXOR) 37.5 mg tablet, , Disp: , Rfl:     Current Allergies     Allergies as of 2025 - Reviewed 2025   Allergen Reaction Noted    Penicillin g Hives 2024    Penicillins Hives 2013            The following portions of the patient's history were reviewed and updated as appropriate: allergies, current medications, past family history, past medical history, past social history, past surgical history and problem list.     Past Medical History:   Diagnosis Date    Anxiety     Asthma     COPD (chronic obstructive pulmonary disease) (HCC)     Migraines        Past Surgical History:   Procedure Laterality Date     SECTION      HAND TENDON SURGERY      LIPOMA RESECTION      WISDOM TOOTH EXTRACTION         Family History  "  Problem Relation Age of Onset    Heart disease Mother     Diabetes Mother     Cancer Father          Medications have been verified.        Objective   /76   Pulse 77   Temp 98.2 °F (36.8 °C)   Resp 16   Ht 5' 3\" (1.6 m)   Wt 92.7 kg (204 lb 6.4 oz)   LMP 01/06/2025   SpO2 97%   BMI 36.21 kg/m²   Patient's last menstrual period was 01/06/2025.       Physical Exam     Physical Exam  Vitals and nursing note reviewed.   Constitutional:       Appearance: Normal appearance.   HENT:      Head: Normocephalic.      Ears:      Comments: B/L TM bulging with redness     Nose: Nose normal.      Mouth/Throat:      Mouth: Mucous membranes are moist.      Pharynx: No posterior oropharyngeal erythema.      Comments: PND  Eyes:      Extraocular Movements: Extraocular movements intact.      Pupils: Pupils are equal, round, and reactive to light.   Cardiovascular:      Rate and Rhythm: Normal rate and regular rhythm.      Heart sounds: Normal heart sounds.   Pulmonary:      Breath sounds: Normal breath sounds.   Neurological:      Mental Status: She is alert and oriented to person, place, and time.   Psychiatric:         Mood and Affect: Mood normal.         Behavior: Behavior normal.                   "

## 2025-01-08 NOTE — LETTER
January 8, 2025     Patient: Lauren Best   YOB: 1985   Date of Visit: 1/8/2025       To Whom it May Concern:    Lauren Best was seen in my clinic on 1/8/2025. She may return back to work once fever free for 24 hours    If you have any questions or concerns, please don't hesitate to call.         Sincerely,          Lisa Bains PA-C        CC: No Recipients

## 2025-01-08 NOTE — Clinical Note
January 8, 2025     Patient: Lauren Best   YOB: 1985   Date of Visit: 1/8/2025       To Whom It May Concern:    It is my medical opinion that Lauren Best {Work release (duty restriction):71878}.    If you have any questions or concerns, please don't hesitate to call.         Sincerely,        Lisa Bains PA-C    CC: No Recipients

## 2025-01-08 NOTE — PROGRESS NOTES
[facility]        NAME: Lauren Best is a 39 y.o. female  : 1985    MRN: [id number]  DATE: 2025  TIME: 12:08 PM    There were no vitals taken for this visit.    Assessment and Plan   No primary diagnosis found.  No diagnosis found.      Patient Instructions       Follow up with PCP in 3-5 days.  Proceed to  ER if symptoms worsen.    Chief Complaint     Chief Complaint   Patient presents with    Ear Problem     Loss of hearing and ear pain starting          History of Present Illness       HPI    Review of Systems   Review of Systems      Current Medications       Current Outpatient Medications:     albuterol (2.5 mg/3 mL) 0.083 % nebulizer solution, Take 3 mL (2.5 mg total) by nebulization every 4 (four) hours as needed for wheezing, Disp: 240 mL, Rfl: 0    albuterol (PROVENTIL HFA,VENTOLIN HFA) 90 mcg/act inhaler, Inhale 2 puffs, Disp: , Rfl:     Budeson-Glycopyrrol-Formoterol 160-9-4.8 MCG/ACT AERO, Inhale 2 puffs 2 (two) times a day, Disp: , Rfl:     budesonide-formoterol (Symbicort) 160-4.5 mcg/act inhaler, Inhale 2 puffs, Disp: , Rfl:     Cholecalciferol (Vitamin D3) POWD, Take by mouth, Disp: , Rfl:     clobetasol (TEMOVATE) 0.05 % cream, , Disp: , Rfl:     clotrimazole (LOTRIMIN) 1 % cream, Apply topically 2 (two) times a day To scaling of feet, Disp: 60 g, Rfl: 1    cyclobenzaprine (FLEXERIL) 10 mg tablet, Take 10 mg by mouth, Disp: , Rfl:     fluconazole (DIFLUCAN) 150 mg tablet, Take 150 mg by mouth once, Disp: , Rfl:     fluticasone (Flovent HFA) 220 mcg/act inhaler, 2 inhalations twice a day.  Rinse mouth after use., Disp: 12 g, Rfl: 0    guaifenesin-codeine (GUAIFENESIN AC) 100-10 MG/5ML liquid, 5 mL q 6 hours or hs prn cough.  Home use only., Disp: 120 mL, Rfl: 0    hydroCHLOROthiazide 25 mg tablet, , Disp: , Rfl:     ibuprofen (MOTRIN) 600 mg tablet, Take 1 tablet (600 mg total) by mouth every 8 (eight) hours as needed for mild pain, Disp: 30 tablet, Rfl: 0     ketoconazole (NIZORAL) 2 % shampoo, Apply 1 Application topically 2 (two) times a week To feet, Disp: 120 mL, Rfl: 0    levalbuterol (XOPENEX HFA) 45 mcg/act inhaler, Inhale 2 puffs every 6 (six) hours as needed, Disp: , Rfl:     levETIRAcetam (KEPPRA) 250 mg tablet, , Disp: , Rfl:     LORazepam (ATIVAN) 1 mg tablet, Take 1 mg by mouth, Disp: , Rfl:     montelukast (Singulair) 10 mg tablet, Take by mouth, Disp: , Rfl:     naproxen (NAPROSYN) 500 mg tablet, Take 1 tablet (500 mg total) by mouth 2 (two) times a day with meals Take 1 tab PO BID for one week. Then only as needed afterwards., Disp: 40 tablet, Rfl: 0    nicotine (NICODERM CQ) 21 mg/24 hr TD 24 hr patch, Place 1 patch on the skin every 24 hours (Patient not taking: Reported on 2024), Disp: , Rfl:     permethrin (ELIMITE) 5 % cream, , Disp: , Rfl:     predniSONE 20 mg tablet, Take 20 mg by mouth 2 (two) times a day with meals (Patient not taking: Reported on 10/30/2024), Disp: , Rfl:     rizatriptan (Maxalt) 10 mg tablet, , Disp: , Rfl:     terbinafine (LamISIL) 250 mg tablet, Take 250 mg by mouth daily, Disp: , Rfl:     topiramate (TOPAMAX) 100 mg tablet, Take 100 mg by mouth, Disp: , Rfl:     Ubrogepant (Ubrelvy) 100 MG tablet, Take 100 mg by mouth, Disp: , Rfl:     venlafaxine (EFFEXOR) 37.5 mg tablet, , Disp: , Rfl:     Current Allergies     Allergies as of 2025 - Reviewed 2024   Allergen Reaction Noted    Penicillin g Hives 2024    Penicillins Hives 2013            The following portions of the patient's history were reviewed and updated as appropriate: allergies, current medications, past family history, past medical history, past social history, past surgical history and problem list.     Past Medical History:   Diagnosis Date    Anxiety     Asthma     COPD (chronic obstructive pulmonary disease) (HCC)     Migraines        Past Surgical History:   Procedure Laterality Date     SECTION      HAND TENDON SURGERY       WISDOM TOOTH EXTRACTION         Family History   Problem Relation Age of Onset    Heart disease Mother     Diabetes Mother     Cancer Father          Medications have been verified.        Objective   There were no vitals taken for this visit.       Physical Exam     Physical Exam

## 2025-01-08 NOTE — PATIENT INSTRUCTIONS
Patient was educated on OTC Flonase for ear congestion.    Stop Doxycycline and start Azithromycin. Eat on Antibiotics.    ENT referral placed.

## 2025-03-08 ENCOUNTER — APPOINTMENT (EMERGENCY)
Dept: RADIOLOGY | Facility: HOSPITAL | Age: 40
End: 2025-03-08
Payer: OTHER MISCELLANEOUS

## 2025-03-08 ENCOUNTER — HOSPITAL ENCOUNTER (EMERGENCY)
Facility: HOSPITAL | Age: 40
Discharge: HOME | End: 2025-03-08
Attending: EMERGENCY MEDICINE
Payer: OTHER MISCELLANEOUS

## 2025-03-08 VITALS
HEIGHT: 63 IN | TEMPERATURE: 98.7 F | OXYGEN SATURATION: 97 % | HEART RATE: 100 BPM | DIASTOLIC BLOOD PRESSURE: 66 MMHG | RESPIRATION RATE: 18 BRPM | BODY MASS INDEX: 36.33 KG/M2 | WEIGHT: 205.03 LBS | SYSTOLIC BLOOD PRESSURE: 141 MMHG

## 2025-03-08 DIAGNOSIS — S93.409A SPRAIN OF ANKLE: ICD-10-CM

## 2025-03-08 DIAGNOSIS — M25.571 ACUTE RIGHT ANKLE PAIN: Primary | ICD-10-CM

## 2025-03-08 PROCEDURE — 73610 X-RAY EXAM OF ANKLE: CPT

## 2025-03-08 PROCEDURE — 73590 X-RAY EXAM OF LOWER LEG: CPT

## 2025-03-08 PROCEDURE — 99283 EMERGENCY DEPT VISIT LOW MDM: CPT

## 2025-03-08 PROCEDURE — 99284 EMERGENCY DEPT VISIT MOD MDM: CPT | Performed by: EMERGENCY MEDICINE

## 2025-03-08 PROCEDURE — 73630 X-RAY EXAM OF FOOT: CPT

## 2025-03-08 RX ORDER — NAPROXEN 500 MG/1
500 TABLET ORAL 2 TIMES DAILY PRN
Qty: 30 TABLET | Refills: 0 | Status: SHIPPED | OUTPATIENT
Start: 2025-03-08

## 2025-03-08 RX ORDER — IBUPROFEN 600 MG/1
600 TABLET, FILM COATED ORAL ONCE
Status: COMPLETED | OUTPATIENT
Start: 2025-03-08 | End: 2025-03-08

## 2025-03-08 RX ADMIN — IBUPROFEN 600 MG: 600 TABLET, FILM COATED ORAL at 07:18

## 2025-03-08 NOTE — ED PROVIDER NOTES
Time reflects when diagnosis was documented in both MDM as applicable and the Disposition within this note       Time User Action Codes Description Comment    3/8/2025  7:15 AM Deni Moore [M25.571] Acute right ankle pain     3/8/2025  7:20 AM Deni Moore [S93.409A] Sprain of ankle           ED Disposition       ED Disposition   Discharge    Condition   Stable    Date/Time   Sat Mar 8, 2025  7:15 AM    Comment   Lauren Best discharge to home/self care.                   Assessment & Plan       Medical Decision Making  Patient presented to the emergency department and a MSE was performed. The patient was evaluated for complaint related to acute right foot and ankle pain.  Patient is potentially at risk for, but not limited to, strain, sprain, fracture, dislocation or ligamentous disruption.  Several of these diagnoses have been evaluated and ruled out by history and physical.  As needed, patient will be further evaluated with laboratory and imaging studies.  Higher level diagnostics, such as CT imaging or ultrasound, may also be required.  Please see work-up portion of the note for further evaluation of patient's risk.  Socioeconomic factors were also considered as part of the decision-making process.  Unless otherwise stated in the chart or patient is admitted as elsewhere documented, any previously prescribed medications will be maintained.      Problems Addressed:  Acute right ankle pain: self-limited or minor problem     Details: Ankle sprain.  Orthopedic follow-up.  Crutches, splint.  Sprain of ankle: self-limited or minor problem    Amount and/or Complexity of Data Reviewed  Radiology: ordered and independent interpretation performed. Decision-making details documented in ED Course.    Risk  Prescription drug management.        ED Course as of 03/08/25 0743   Sat Mar 08, 2025   0719 No fractures.  Cam boot, crutches, follow-up with orthopedics.   0741 Patient expressed upset that she felt as if  "her foot was numb.  I attempted to reassure her that she had good capillary refill and that there were good pulses present.  There was no change in the pigmentation of her foot.  I attempted to explain to the patient that this was likely secondary to the edema and swelling and would resolve with conservative measures at home.  I also discussed with her at length all reasons for follow-up with orthopedics including the possibility of a delayed finding of an occult fracture which is common in the foot and wrist.  I also discussed all reasons for return to the emergency room.    Patient expressed dissatisfaction with an answer to the nursing staff.       Medications   ibuprofen (MOTRIN) tablet 600 mg (600 mg Oral Given 3/8/25 0718)       ED Risk Strat Scores                                                History of Present Illness       Chief Complaint   Patient presents with    Ankle Pain     Pt. Tripped while walking at work at 0400.       Past Medical History:   Diagnosis Date    Anxiety     Asthma     COPD (chronic obstructive pulmonary disease) (HCC)     Migraines       Past Surgical History:   Procedure Laterality Date     SECTION      HAND TENDON SURGERY      LIPOMA RESECTION      WISDOM TOOTH EXTRACTION        Family History   Problem Relation Age of Onset    Heart disease Mother     Diabetes Mother     Cancer Father       Social History     Tobacco Use    Smoking status: Every Day     Current packs/day: 0.50     Types: Cigarettes    Smokeless tobacco: Never   Vaping Use    Vaping status: Never Used   Substance Use Topics    Alcohol use: Not Currently    Drug use: Never      E-Cigarette/Vaping    E-Cigarette Use Never User       E-Cigarette/Vaping Substances      I have reviewed and agree with the history as documented.     39-year-old female to the emergency room for evaluation of right foot and ankle pain status post \"overstepping\" her foot and ankle this morning around 4 AM.  Patient reports that she " fell landed on her knees.  Patient has contusions to both knees.  Patient is complaining of pain of her entire right lower leg.  Worse with ambulation.      History provided by:  Patient  Ankle Pain  Location:  Leg, ankle and foot  Injury: yes    Mechanism of injury: fall    Leg location:  R leg  Ankle location:  R ankle  Foot location:  R foot      Review of Systems   Musculoskeletal:  Positive for arthralgias, gait problem and myalgias.           Objective       ED Triage Vitals [03/08/25 0626]   Temperature Pulse Blood Pressure Respirations SpO2 Patient Position - Orthostatic VS   98.7 °F (37.1 °C) 100 141/66 18 97 % Sitting      Temp Source Heart Rate Source BP Location FiO2 (%) Pain Score    Temporal Monitor Right arm -- 10 - Worst Possible Pain      Vitals      Date and Time Temp Pulse SpO2 Resp BP Pain Score FACES Pain Rating User   03/08/25 0626 98.7 °F (37.1 °C) 100 97 % 18 141/66 10 - Worst Possible Pain -- TH            Physical Exam  Vitals and nursing note reviewed.   Constitutional:       General: She is in acute distress.      Appearance: She is normal weight. She is not ill-appearing or toxic-appearing.   HENT:      Head: Normocephalic and atraumatic.      Right Ear: External ear normal.      Left Ear: External ear normal.      Nose: Nose normal.      Mouth/Throat:      Mouth: Mucous membranes are moist.   Pulmonary:      Effort: Pulmonary effort is normal. No respiratory distress.   Musculoskeletal:         General: Tenderness and signs of injury present. No swelling or deformity.      Right lower leg: Tenderness present.      Left lower leg: Normal.      Right ankle: No swelling or deformity. Tenderness present.      Left ankle: Normal.      Right foot: Normal range of motion. Tenderness and bony tenderness present.      Left foot: Normal.        Legs:         Feet:    Skin:     Coloration: Skin is not pale.      Findings: Bruising present.   Neurological:      General: No focal deficit present.       Mental Status: She is alert.   Psychiatric:         Mood and Affect: Mood is anxious.         Results Reviewed       None            XR tibia fibula 2 views RIGHT   ED Interpretation by Deni Moore DO (03/08 0719)   No fracture      XR foot 3+ views RIGHT   ED Interpretation by Deni Moore DO (03/08 0719)   No fracture      XR ankle 3+ views RIGHT   ED Interpretation by Deni Moore DO (03/08 0719)   No fracture          Procedures    ED Medication and Procedure Management   Prior to Admission Medications   Prescriptions Last Dose Informant Patient Reported? Taking?   Budeson-Glycopyrrol-Formoterol 160-9-4.8 MCG/ACT AERO   Yes No   Sig: Inhale 2 puffs 2 (two) times a day   LORazepam (ATIVAN) 1 mg tablet   Yes No   Sig: Take 1 mg by mouth   Ubrogepant (Ubrelvy) 100 MG tablet   Yes No   Sig: Take 100 mg by mouth   albuterol (2.5 mg/3 mL) 0.083 % nebulizer solution   No No   Sig: Take 3 mL (2.5 mg total) by nebulization every 4 (four) hours as needed for wheezing   albuterol (PROVENTIL HFA,VENTOLIN HFA) 90 mcg/act inhaler   Yes No   Sig: Inhale 2 puffs   cyclobenzaprine (FLEXERIL) 10 mg tablet   Yes No   Sig: Take 10 mg by mouth   levETIRAcetam (KEPPRA) 250 mg tablet   Yes No   Patient not taking: Reported on 1/8/2025   predniSONE 20 mg tablet   Yes No   Sig: Take 20 mg by mouth 2 (two) times a day with meals   rizatriptan (Maxalt) 10 mg tablet   Yes No   topiramate (TOPAMAX) 100 mg tablet   Yes No   Sig: Take 100 mg by mouth      Facility-Administered Medications: None     Discharge Medication List as of 3/8/2025  7:22 AM        START taking these medications    Details   naproxen (NAPROSYN) 500 mg tablet Take 1 tablet (500 mg total) by mouth 2 (two) times a day as needed for mild pain or moderate pain, Starting Sat 3/8/2025, Normal           CONTINUE these medications which have NOT CHANGED    Details   albuterol (2.5 mg/3 mL) 0.083 % nebulizer solution Take 3 mL (2.5 mg total) by nebulization every 4  (four) hours as needed for wheezing, Starting Sat 1/8/2022, Normal      albuterol (PROVENTIL HFA,VENTOLIN HFA) 90 mcg/act inhaler Inhale 2 puffs, Historical Med      Budeson-Glycopyrrol-Formoterol 160-9-4.8 MCG/ACT AERO Inhale 2 puffs 2 (two) times a day, Starting Thu 10/17/2024, Until Sun 9/21/2025, Historical Med      cyclobenzaprine (FLEXERIL) 10 mg tablet Take 10 mg by mouth, Historical Med      levETIRAcetam (KEPPRA) 250 mg tablet Historical Med      LORazepam (ATIVAN) 1 mg tablet Take 1 mg by mouth, Historical Med      predniSONE 20 mg tablet Take 20 mg by mouth 2 (two) times a day with meals, Starting Thu 10/3/2024, Historical Med      rizatriptan (Maxalt) 10 mg tablet Historical Med      topiramate (TOPAMAX) 100 mg tablet Take 100 mg by mouth, Starting Tue 11/30/2021, Historical Med      Ubrogepant (Ubrelvy) 100 MG tablet Take 100 mg by mouth, Historical Med             ED SEPSIS DOCUMENTATION   Time reflects when diagnosis was documented in both MDM as applicable and the Disposition within this note       Time User Action Codes Description Comment    3/8/2025  7:15 AM Deni Moore [M25.571] Acute right ankle pain     3/8/2025  7:20 AM Deni Moore [S93.409A] Sprain of ankle                  Deni Moore,   03/08/25 0743

## 2025-03-08 NOTE — Clinical Note
Lauren Best was seen and treated in our emergency department on 3/8/2025.        No work until cleared by Family Doctor/Orthopedics        Diagnosis:     Lauren  .    She may return on this date:          If you have any questions or concerns, please don't hesitate to call.      Deni Moore, DO    ______________________________           _______________          _______________  Hospital Representative                              Date                                Time

## 2025-03-08 NOTE — DISCHARGE INSTRUCTIONS
Use pain medications as directed.  We recommend ice 4-6 times a day for 15 to 20 minutes at a time to the affected area.  Try and leave the area elevated to help reduce swelling and pain.  Return with any worsening, particularly increased pain, severe swelling, or any other symptomatology that seems concerning.    Your imaging studies have been preliminarily reviewed by the emergency department.  Further review by Radiology is pending at this time.  If there is a discrepancy or a finding of additional concern identified, we will attempt to contact you at the number you have provided us.  If you do not hear from us, follow-up with your primary care provider within 1-2 weeks is always recommended to ensure that all findings were normal or as initially reported.  Your results may also be available on [email or web address]    Please also note that sometimes there are subtle abnormalities in your lab values that you may observe when you access your record online.  These are frequently not worrisome and if they are of concern we will have discussed them with you.  However, we always encourage that you discuss any concerns you may have or observe on your record with your primary care provider.  Please also note that while your visit documentation was reviewed prior to completion, voice transcription will occasionally recognize words or grammar differently than what was spoken.

## 2025-03-08 NOTE — ED NOTES
Cam boot applied and adjusted per patient preference. Pt reported she feels unable to walk in cam boot. Crutches offered to patient, pt refused. Pt offered wheelchair to car and pt refused. Pt ambulatory with cam boot on with daughter out of ED. A&Ox4       Aiyana Gibson RN  03/08/25 2450